# Patient Record
Sex: FEMALE | Race: BLACK OR AFRICAN AMERICAN
[De-identification: names, ages, dates, MRNs, and addresses within clinical notes are randomized per-mention and may not be internally consistent; named-entity substitution may affect disease eponyms.]

---

## 2017-05-08 ENCOUNTER — HOSPITAL ENCOUNTER (INPATIENT)
Dept: HOSPITAL 61 - ER | Age: 67
LOS: 7 days | Discharge: NURSING, REHAB OR LONG-TERM CARE | DRG: 682 | End: 2017-05-15
Attending: FAMILY MEDICINE | Admitting: FAMILY MEDICINE
Payer: MEDICARE

## 2017-05-08 VITALS — SYSTOLIC BLOOD PRESSURE: 191 MMHG | DIASTOLIC BLOOD PRESSURE: 71 MMHG

## 2017-05-08 VITALS — HEIGHT: 68 IN | WEIGHT: 292.19 LBS | BODY MASS INDEX: 44.28 KG/M2

## 2017-05-08 VITALS — SYSTOLIC BLOOD PRESSURE: 183 MMHG | DIASTOLIC BLOOD PRESSURE: 76 MMHG

## 2017-05-08 DIAGNOSIS — Z79.2: ICD-10-CM

## 2017-05-08 DIAGNOSIS — Z79.899: ICD-10-CM

## 2017-05-08 DIAGNOSIS — Z88.2: ICD-10-CM

## 2017-05-08 DIAGNOSIS — Z99.2: ICD-10-CM

## 2017-05-08 DIAGNOSIS — Z88.8: ICD-10-CM

## 2017-05-08 DIAGNOSIS — Z88.0: ICD-10-CM

## 2017-05-08 DIAGNOSIS — E46: ICD-10-CM

## 2017-05-08 DIAGNOSIS — E11.22: ICD-10-CM

## 2017-05-08 DIAGNOSIS — N18.6: ICD-10-CM

## 2017-05-08 DIAGNOSIS — I12.0: Primary | ICD-10-CM

## 2017-05-08 DIAGNOSIS — E21.3: ICD-10-CM

## 2017-05-08 DIAGNOSIS — Z79.82: ICD-10-CM

## 2017-05-08 DIAGNOSIS — Z88.5: ICD-10-CM

## 2017-05-08 DIAGNOSIS — G93.41: ICD-10-CM

## 2017-05-08 DIAGNOSIS — D63.1: ICD-10-CM

## 2017-05-08 LAB
ALBUMIN SERPL-MCNC: 3 G/DL (ref 3.4–5)
ALBUMIN/GLOB SERPL: 0.7 {RATIO} (ref 1–1.7)
ALP SERPL-CCNC: 83 U/L (ref 46–116)
ALT SERPL-CCNC: 22 U/L (ref 14–59)
ANION GAP SERPL CALC-SCNC: 14 MMOL/L (ref 6–14)
AST SERPL-CCNC: 25 U/L (ref 15–37)
BACTERIA #/AREA URNS HPF: 0 /HPF
BASOPHILS # BLD AUTO: 0 X10^3/UL (ref 0–0.2)
BASOPHILS NFR BLD: 1 % (ref 0–3)
BILIRUB SERPL-MCNC: 0.5 MG/DL (ref 0.2–1)
BILIRUB UR QL STRIP: NEGATIVE
BUN SERPL-MCNC: 73 MG/DL (ref 7–20)
BUN/CREAT SERPL: 17 (ref 6–20)
CALCIUM SERPL-MCNC: 10.3 MG/DL (ref 8.5–10.1)
CHLORIDE SERPL-SCNC: 109 MMOL/L (ref 98–107)
CO2 SERPL-SCNC: 21 MMOL/L (ref 21–32)
CREAT SERPL-MCNC: 4.4 MG/DL (ref 0.6–1)
EOSINOPHIL NFR BLD: 2 % (ref 0–3)
ERYTHROCYTE [DISTWIDTH] IN BLOOD BY AUTOMATED COUNT: 18.7 % (ref 11.5–14.5)
GFR SERPLBLD BASED ON 1.73 SQ M-ARVRAT: 12.1 ML/MIN
GLOBULIN SER-MCNC: 4.3 G/DL (ref 2.2–3.8)
GLUCOSE SERPL-MCNC: 124 MG/DL (ref 70–99)
GLUCOSE UR STRIP-MCNC: NEGATIVE MG/DL
HCO3 BLDA-SCNC: 19 MMOL/L (ref 21–28)
HCT VFR BLD CALC: 29.6 % (ref 36–47)
HGB BLD-MCNC: 9.4 G/DL (ref 12–15.5)
INSPIRATION SETTING TIME VENT: 21
LYMPHOCYTES # BLD: 1.2 X10^3/UL (ref 1–4.8)
LYMPHOCYTES NFR BLD AUTO: 17 % (ref 24–48)
MCH RBC QN AUTO: 34 PG (ref 25–35)
MCHC RBC AUTO-ENTMCNC: 32 G/DL (ref 31–37)
MCV RBC AUTO: 107 FL (ref 79–100)
MONOCYTES NFR BLD: 13 % (ref 0–9)
NEUTROPHILS NFR BLD AUTO: 68 % (ref 31–73)
NITRITE UR QL STRIP: NEGATIVE
PCO2 BLDA: 39 MMHG (ref 35–46)
PH ABG: 7.31 (ref 7.35–7.45)
PH UR STRIP: 5.5 [PH]
PLATELET # BLD AUTO: 166 X10^3/UL (ref 140–400)
PO2 BLDA: 68 MMHG (ref 65–108)
POTASSIUM SERPL-SCNC: 5 MMOL/L (ref 3.5–5.1)
PROT SERPL-MCNC: 7.3 G/DL (ref 6.4–8.2)
PROT UR STRIP-MCNC: 30 MG/DL
RBC # BLD AUTO: 2.76 X10^6/UL (ref 3.5–5.4)
RBC #/AREA URNS HPF: (no result) /HPF (ref 0–2)
SAO2 % BLDA: 92 % (ref 92–99)
SODIUM SERPL-SCNC: 144 MMOL/L (ref 136–145)
SP GR UR STRIP: 1.01
SQUAMOUS #/AREA URNS LPF: (no result) /LPF
UROBILINOGEN UR-MCNC: 0.2 MG/DL
WBC # BLD AUTO: 7.4 X10^3/UL (ref 4–11)
WBC #/AREA URNS HPF: (no result) /HPF (ref 0–4)

## 2017-05-08 PROCEDURE — 82140 ASSAY OF AMMONIA: CPT

## 2017-05-08 PROCEDURE — C1892 INTRO/SHEATH,FIXED,PEEL-AWAY: HCPCS

## 2017-05-08 PROCEDURE — 82947 ASSAY GLUCOSE BLOOD QUANT: CPT

## 2017-05-08 PROCEDURE — 76937 US GUIDE VASCULAR ACCESS: CPT

## 2017-05-08 PROCEDURE — 81001 URINALYSIS AUTO W/SCOPE: CPT

## 2017-05-08 PROCEDURE — 36558 INSERT TUNNELED CV CATH: CPT

## 2017-05-08 PROCEDURE — 70450 CT HEAD/BRAIN W/O DYE: CPT

## 2017-05-08 PROCEDURE — 77001 FLUOROGUIDE FOR VEIN DEVICE: CPT

## 2017-05-08 PROCEDURE — 87040 BLOOD CULTURE FOR BACTERIA: CPT

## 2017-05-08 PROCEDURE — 36415 COLL VENOUS BLD VENIPUNCTURE: CPT

## 2017-05-08 PROCEDURE — C1750 CATH, HEMODIALYSIS,LONG-TERM: HCPCS

## 2017-05-08 PROCEDURE — 80053 COMPREHEN METABOLIC PANEL: CPT

## 2017-05-08 PROCEDURE — 96374 THER/PROPH/DIAG INJ IV PUSH: CPT

## 2017-05-08 PROCEDURE — 80076 HEPATIC FUNCTION PANEL: CPT

## 2017-05-08 PROCEDURE — 93005 ELECTROCARDIOGRAM TRACING: CPT

## 2017-05-08 PROCEDURE — 87340 HEPATITIS B SURFACE AG IA: CPT

## 2017-05-08 PROCEDURE — 82805 BLOOD GASES W/O2 SATURATION: CPT

## 2017-05-08 PROCEDURE — 80048 BASIC METABOLIC PNL TOTAL CA: CPT

## 2017-05-08 PROCEDURE — 86704 HEP B CORE ANTIBODY TOTAL: CPT

## 2017-05-08 PROCEDURE — 85610 PROTHROMBIN TIME: CPT

## 2017-05-08 PROCEDURE — A4215 STERILE NEEDLE: HCPCS

## 2017-05-08 PROCEDURE — 86706 HEP B SURFACE ANTIBODY: CPT

## 2017-05-08 PROCEDURE — 87341 HEP B SURFACE AG NEUTRLZJ IA: CPT

## 2017-05-08 PROCEDURE — 71010: CPT

## 2017-05-08 PROCEDURE — 87086 URINE CULTURE/COLONY COUNT: CPT

## 2017-05-08 PROCEDURE — 85027 COMPLETE CBC AUTOMATED: CPT

## 2017-05-08 PROCEDURE — 36600 WITHDRAWAL OF ARTERIAL BLOOD: CPT

## 2017-05-08 RX ADMIN — INSULIN DETEMIR SCH UNITS: 100 INJECTION, SOLUTION SUBCUTANEOUS at 22:30

## 2017-05-08 NOTE — ED.ADGEN
Past Medical History


Past Medical History:  Diabetes-Type II, Renal Disease


Alcohol Use:  None


Drug Use:  None





Adult General


Chief Complaint


Chief Complaint:  ALTERED MENTAL STATUS





HPI


HPI


Patient is a 66  year old -American insulin-dependent diabetes with end-

stage renal disease with recent surgery of left forearm dialysis fistula with 

altered mental status. Patient's family member report patient's been confused 

with somnolence for the past 2 days. Symptoms started vaginally and have been 

progressive. Patient is not currently taking any of her home pain medications. 

She does not have a fever, cough, sore throat. She denies abdominal pain, 

urinary frequency urgency or dysuria. Denies headache, change in vision, 

dizziness or weakness or any strokelike symptom. Blood sugar checked by EMS or 

to arrival noted to be normal.





Review of Systems


Review of Systems


ROS as per HPI.





Current Medications


Current Medications





Current Medications








 Medications


  (Trade)  Dose


 Ordered  Sig/Alisha  Start Time


 Stop Time Status Last Admin


Dose Admin


 


 Vancomycin HCl


 1.25 gm/Sodium


 Chloride  250 ml @ 


 166.667


 mls/hr  1X  ONCE  5/8/17 18:15


 5/8/17 18:26 DC  


 











Allergies


Allergies





Allergies








Coded Allergies Type Severity Reaction Last Updated Verified


 


  Penicillins Allergy Intermediate  10/13/16 Yes


 


  Sulfa (Sulfonamide Antibiotics) Allergy Intermediate  10/13/16 Yes


 


  adhesive tape Allergy Intermediate  5/8/17 Yes


 


  codeine Allergy Intermediate  10/13/16 Yes


 


  doxycycline Allergy Intermediate  10/13/16 Yes











Physical Exam


Physical Exam





Constitutional: Well developed, well nourished, no acute distress, non-toxic 

appearance. 


HENT: Normocephalic, atraumatic, bilateral external ears normal, oropharynx 

moist, no oral exudates, nose normal. 


Eyes: PERRL. 


Neck: Normal range of motion, no tenderness, supple.


Cardiovascular:Heart rate regular rhythm, no murmur. 


Lungs & Thorax:  Bilateral breath sounds clear to auscultation . 


Abdomen: Bowel sounds normal, soft, no tenderness. 


Skin: Warm, dry, appropriate for ethnicity.


Back: No tenderness, no CVA tenderness. 


Extremities: Left forearm dialysis fistula surgical incision with light 

erythema and swelling surrounding surgical wound. Sutures are clean dry and 

intact.


Neurologic: Alert and oriented X 2, normal motor function, normal sensory 

function, no focal deficits noted. 


Psychologic: Affect normal, judgement normal, mood normal.





Current Patient Data


Vital Signs





 Vital Signs








  Date Time  Temp Pulse Resp B/P (MAP) Pulse Ox O2 Delivery O2 Flow Rate FiO2


 


5/8/17 17:44  72 20 184/79 (114) 95 Room Air  


 


5/8/17 15:47 99.0       





 99.0       








Lab Values





 Laboratory Tests








Test


  5/8/17


16:34 5/8/17


16:40 5/8/17


16:51


 


White Blood Count


  7.4 x10^3/uL


(4.0-11.0) 


  


 


 


Red Blood Count


  2.76 x10^6/uL


(3.50-5.40)  L 


  


 


 


Hemoglobin


  9.4 g/dL


(12.0-15.5)  L 


  


 


 


Hematocrit


  29.6 %


(36.0-47.0)  L 


  


 


 


Mean Corpuscular Volume


  107 fL


()  H 


  


 


 


Mean Corpuscular Hemoglobin 34 pg (25-35)    


 


Mean Corpuscular Hemoglobin


Concent 32 g/dL


(31-37) 


  


 


 


Red Cell Distribution Width


  18.7 %


(11.5-14.5)  H 


  


 


 


Platelet Count


  166 x10^3/uL


(140-400) 


  


 


 


Neutrophils (%) (Auto) 68 % (31-73)    


 


Lymphocytes (%) (Auto) 17 % (24-48)  L  


 


Monocytes (%) (Auto) 13 % (0-9)  H  


 


Eosinophils (%) (Auto) 2 % (0-3)    


 


Basophils (%) (Auto) 1 % (0-3)    


 


Neutrophils # (Auto)


  5.0 x10^3uL


(1.8-7.7) 


  


 


 


Lymphocytes # (Auto)


  1.2 x10^3/uL


(1.0-4.8) 


  


 


 


Monocytes # (Auto)


  0.9 x10^3/uL


(0.0-1.1) 


  


 


 


Eosinophils # (Auto)


  0.2 x10^3/uL


(0.0-0.7) 


  


 


 


Basophils # (Auto)


  0.0 x10^3/uL


(0.0-0.2) 


  


 


 


Sodium Level


  144 mmol/L


(136-145) 


  


 


 


Potassium Level


  5.0 mmol/L


(3.5-5.1) 


  


 


 


Chloride Level


  109 mmol/L


()  H 


  


 


 


Carbon Dioxide Level


  21 mmol/L


(21-32) 


  


 


 


Anion Gap 14 (6-14)    


 


Blood Urea Nitrogen


  73 mg/dL


(7-20)  H 


  


 


 


Creatinine


  4.4 mg/dL


(0.6-1.0)  H 


  


 


 


Estimated GFR


(Cockcroft-Gault) 12.1  


  


  


 


 


BUN/Creatinine Ratio 17 (6-20)    


 


Glucose Level


  124 mg/dL


(70-99)  H 


  


 


 


Calcium Level


  10.3 mg/dL


(8.5-10.1)  H 


  


 


 


Total Bilirubin


  0.5 mg/dL


(0.2-1.0) 


  


 


 


Aspartate Amino Transferase


(AST) 25 U/L (15-37)


  


  


 


 


Alanine Aminotransferase (ALT)


  22 U/L (14-59)


  


  


 


 


Alkaline Phosphatase


  83 U/L


() 


  


 


 


Ammonia


  11 mcmol/L


(11-34) 


  


 


 


Total Protein


  7.3 g/dL


(6.4-8.2) 


  


 


 


Albumin


  3.0 g/dL


(3.4-5.0)  L 


  


 


 


Albumin/Globulin Ratio


  0.7 (1.0-1.7)


L 


  


 


 


O2 Saturation  92 % (92-99)   


 


Arterial Blood pH


  


  7.31


(7.35-7.45)  L 


 


 


Arterial Blood pCO2 at


Patient Temp 


  39 mmHg


(35-46) 


 


 


Arterial Blood pO2 at Patient


Temp 


  68 mmHg


() 


 


 


Arterial Blood HCO3


  


  19 mmol/L


(21-28)  L 


 


 


Arterial Blood Base Excess


  


  -7 mmol/L


(-3-3)  L 


 


 


FiO2  21   


 


Urine Collection Type   U cath  


 


Urine Color   Yellow  


 


Urine Clarity   Cloudy  


 


Urine pH   5.5  


 


Urine Specific Gravity   1.010  


 


Urine Protein


  


  


  30 mg/dL


(NEG-TRACE)


 


Urine Glucose (UA)


  


  


  Negative mg/dL


(NEG)


 


Urine Ketones (Stick)


  


  


  Negative mg/dL


(NEG)


 


Urine Blood


  


  


  Negative (NEG)


 


 


Urine Nitrite


  


  


  Negative (NEG)


 


 


Urine Bilirubin


  


  


  Negative (NEG)


 


 


Urine Urobilinogen Dipstick


  


  


  0.2 mg/dL (0.2


mg/dL)


 


Urine Leukocyte Esterase   Trace (NEG)  


 


Urine RBC


  


  


  Occ /HPF (0-2)


 


 


Urine WBC


  


  


  1-4 /HPF (0-4)


 


 


Urine Squamous Epithelial


Cells 


  


  Mod /LPF  


 


 


Urine Amorphous Sediment   Present /HPF  


 


Urine Bacteria


  


  


  0 /HPF (0-FEW)


 


 


Urine Mucus   Slight /LPF  





 Laboratory Tests


5/8/17 16:34








 Laboratory Tests


5/8/17 16:34











EKG


EKG


[EKG: Sinus rhythm]





Radiology/Procedures


Radiology/Procedures


[CT head: No acute disease


X-ray: Cardiomegaly]


Impressions:


Altered mental status etiology unclear. No focal neurologic deficits. Patient 

with low-grade fever and possible early cellulitis of left fistula surgical 

wound. Concern for possible early sepsis.





Course & Med Decision Making


Course & Med Decision Making


Pertinent Labs and Imaging studies reviewed. (See chart for details)





[Case reviewed with Dr. Guillermo Cummings in detail. Recommendations are. 

Antibiotics, admission, vascular surgery and nephrology consult.]





Dragon Disclaimer


Dragon Disclaimer


This electronic medical record was generated, in whole or in part, using a 

voice recognition dictation system.











TAMIKO EDMONDS DO May 8, 2017 16:21

## 2017-05-08 NOTE — RAD
Exam performed: CT scan of the head without contrast. 



Date of Service: 04/08/17. Comparison: None available.



Clinical History: Altered mental status, poor historian.



Technique: Helical acquisitions are obtained from the foramen magnum to the

vertex without intravenous administration of contrast. 



Findings:



The ventricles are midline without evidence of dilatation. Normal gray-white

differentiation is maintained.  There is no extra axial fluid collection,

intraparenchymal hemorrhage or mass lesion.  The visualized portions of the

orbits, paranasal sinuses and the mastoid air cells appear clear.  The

calvarium is intact.  



Impression:

1. No acute intracranial process detected. 





PQRS Compliance Statement:



One or more of the following individualized dose reduction techniques were

utilized for this examination:

1. Automated exposure control

2. Adjustment of the mA and/or kV according to patient size

3. Use of iterative reconstruction technique

## 2017-05-08 NOTE — RAD
Exam performed: One view chest. 



Indication: shortness of air today



Date of Service: 5/8/2017 5:51 PM  Comparison: Two-view chest from 10/29/09



Single AP upright portable view chest findings:



Cardiomediastinal silhouette is mildly enlarged.  Pulmonary vascularity is

unremarkable. No acute infiltrates, effusion or pneumothorax is detected.  The

bony structures are normal. 



Impression:



Mild cardiomegaly, otherwise unremarkable exam

## 2017-05-08 NOTE — [facility]
Admission Forms Criteria


                                           MENTAL STATUS CHANGE





   Clinical Indications for Inpatient Care    


                                                                     


                                                                 (Place 'X' for 

any and all applicable criteria):  





Ongoing inpatient care may be needed for 1 or more of the following(1)(2)(3)(5)(

6): 





[X]I.    Suspected serious etiology (eg, medical disorder, CNS event) of 

altered mental status


[ ]II.   Danger to self or others not manageable at lower level of care


[ ]III.  Grave disability (eg, inability to perform self care necessary at 

lower level of care)


[ ]IV. Agitation or inappropriate behavior interfering with care for primary 

condition (eg, attempting to discontinue


        lines or drains prematurely, unable to cooperate with respiratory care)


[ ]V.  Delirium [A] [D][E] as described by 1 or more of the following(26):


         [ ]a)  Delirium due to alcohol or sedative [F] withdrawal


         [ ]b)  Delirium of uncertain etiology that has not responded to 

appropriate empiric treatment


         [ ]c)  Delirium that prevents performance of a life-sustaining 

function (eg, feeding or hydrating oneself)





[ ]VI.  General contraindications and/or Inappropriate clinical situations for 

Observational Care in patients


          with Mental Status Change, when ANY ONE of the following is required:


          [ ]a)   Prediction of prolongation of LOS based on ANY ONE of the 

following may be considered as 


                    a contraindication for observational care 2, 3, 4, 5, 6, 7, 

8, 9, 10, 11


                    [ ]i)    Age > 65 yrs.


                    [ ]ii)   Patient arriving by ambulance


                    [ ]iii)  Patient with high acuity


                    [ ]iv)  Patient requiring vital sign monitoring


                    [ ]v)   Patient on IV medication


          [ ]b)   Systolic blood pressures  greater than or equal to 180mmHg 3,

12


          [ ]c)   Patient with altered mental status including delirium and 

other alteration of consciousness, (3)


          [ ]d)   Patient whose discharge disposition will be to a skilled 

nursing home or rehabilitation home should 


                   not be managed in Emergency Department Observation Unit. CMS 

rule requires 3 days hospital stay before such placement.3,13


          [ ]e)   Patient with failure to thrive due to broad array of 

etiologies 3,16,17


          [ ]f)   Inability to ambulate 3,14








Extended stay beyond goal length of stay for the primary condition may be 

needed until ALL of the following are present(3)(5):


[ ]a)  Underlying medical etiology of mental status change is absent, or has 

been established and adequately treated


[ ]b)  Danger to self or others is absent or manageable at lower level of care.


[ ]c)  Behavior crisis management, including physical or chemical restraints, 

is not required or available at lower level of car


[ ]d)  Substance or alcohol withdrawal is absent or manageable at lower level 

of care.


[ ]e)  Behavioral symptoms (eg, agitation, somnolence, inappropriate behavior) 

are absent, or are manageable at lower level of care.








The original [facility] content created by [facility] has been revised. 


The portions of the content which have been revised are identified through the 

use of italic text or in bold, and [facility] 


has neither reviewed nor approved the modified material. All other unmodified 

content is copyright  [facility].





Please see references footnoted in the original [facility] edition 

2016


Admission Criteria Met?:  Yes











MARTHA YOUNG May 8, 2017 18:08

## 2017-05-08 NOTE — EKG
[facility]

              [address] [place] [address]

Test Date:    2017               Test Time:    16:33:45

Pat Name:     ARLEEN TRAN             Department:   

Patient ID:   [id number]           Room:          

Gender:       Female                   Technician:   

:          1950               Requested By: TAMIKO EDMONDS

Order Number: [id number]            Reading MD:   John Sims

                                 Measurements

Intervals                              Axis          

Rate:         73                       P:            27

MN:           152                      QRS:          8

QRSD:         84                       T:            28

QT:           374                                    

QTc:          416                                    

                           Interpretive Statements

SINUS RHYTHM



Electronically Signed On 5- 8:59:03 CDT by John Sims

## 2017-05-09 VITALS — SYSTOLIC BLOOD PRESSURE: 130 MMHG | DIASTOLIC BLOOD PRESSURE: 45 MMHG

## 2017-05-09 VITALS — DIASTOLIC BLOOD PRESSURE: 73 MMHG | SYSTOLIC BLOOD PRESSURE: 195 MMHG

## 2017-05-09 VITALS — SYSTOLIC BLOOD PRESSURE: 188 MMHG | DIASTOLIC BLOOD PRESSURE: 67 MMHG

## 2017-05-09 VITALS — DIASTOLIC BLOOD PRESSURE: 59 MMHG | SYSTOLIC BLOOD PRESSURE: 106 MMHG

## 2017-05-09 VITALS — SYSTOLIC BLOOD PRESSURE: 187 MMHG | DIASTOLIC BLOOD PRESSURE: 70 MMHG

## 2017-05-09 VITALS — DIASTOLIC BLOOD PRESSURE: 56 MMHG | SYSTOLIC BLOOD PRESSURE: 119 MMHG

## 2017-05-09 LAB
ANION GAP SERPL CALC-SCNC: 14 MMOL/L (ref 6–14)
BASOPHILS # BLD AUTO: 0 X10^3/UL (ref 0–0.2)
BASOPHILS NFR BLD: 0 % (ref 0–3)
BUN SERPL-MCNC: 69 MG/DL (ref 7–20)
CALCIUM SERPL-MCNC: 9.6 MG/DL (ref 8.5–10.1)
CHLORIDE SERPL-SCNC: 112 MMOL/L (ref 98–107)
CO2 SERPL-SCNC: 20 MMOL/L (ref 21–32)
CREAT SERPL-MCNC: 4.1 MG/DL (ref 0.6–1)
EOSINOPHIL NFR BLD: 2 % (ref 0–3)
ERYTHROCYTE [DISTWIDTH] IN BLOOD BY AUTOMATED COUNT: 18.2 % (ref 11.5–14.5)
GFR SERPLBLD BASED ON 1.73 SQ M-ARVRAT: 13.1 ML/MIN
GLUCOSE SERPL-MCNC: 127 MG/DL (ref 70–99)
HCT VFR BLD CALC: 27.5 % (ref 36–47)
HGB BLD-MCNC: 9.2 G/DL (ref 12–15.5)
INR PPP: 1.3 (ref 0.8–1.1)
LYMPHOCYTES # BLD: 1.3 X10^3/UL (ref 1–4.8)
LYMPHOCYTES NFR BLD AUTO: 17 % (ref 24–48)
MCH RBC QN AUTO: 35 PG (ref 25–35)
MCHC RBC AUTO-ENTMCNC: 34 G/DL (ref 31–37)
MCV RBC AUTO: 106 FL (ref 79–100)
MONOCYTES NFR BLD: 15 % (ref 0–9)
NEUTROPHILS NFR BLD AUTO: 66 % (ref 31–73)
PLATELET # BLD AUTO: 156 X10^3/UL (ref 140–400)
POTASSIUM SERPL-SCNC: 4.7 MMOL/L (ref 3.5–5.1)
PROTHROMBIN TIME: 15 SEC (ref 11.7–14)
RBC # BLD AUTO: 2.6 X10^6/UL (ref 3.5–5.4)
SODIUM SERPL-SCNC: 146 MMOL/L (ref 136–145)
WBC # BLD AUTO: 7.6 X10^3/UL (ref 4–11)

## 2017-05-09 RX ADMIN — ALLOPURINOL SCH MG: 100 TABLET ORAL at 08:46

## 2017-05-09 RX ADMIN — INSULIN ASPART SCH UNITS: 100 INJECTION, SOLUTION INTRAVENOUS; SUBCUTANEOUS at 16:46

## 2017-05-09 RX ADMIN — INSULIN ASPART SCH UNITS: 100 INJECTION, SOLUTION INTRAVENOUS; SUBCUTANEOUS at 12:00

## 2017-05-09 RX ADMIN — MULTIPLE VITAMINS W/ MINERALS TAB SCH TAB: TAB at 09:00

## 2017-05-09 RX ADMIN — PHENYTOIN SODIUM SCH MG: 100 CAPSULE ORAL at 11:30

## 2017-05-09 RX ADMIN — EXTENDED PHENYTOIN SODIUM SCH MG: 30 CAPSULE ORAL at 22:41

## 2017-05-09 RX ADMIN — PANTOPRAZOLE SODIUM SCH MG: 40 TABLET, DELAYED RELEASE ORAL at 08:44

## 2017-05-09 RX ADMIN — PARICALCITOL SCH MCG: 1 CAPSULE, LIQUID FILLED ORAL at 08:44

## 2017-05-09 RX ADMIN — ASPIRIN SCH MG: 81 TABLET, COATED ORAL at 08:44

## 2017-05-09 RX ADMIN — CARVEDILOL SCH MG: 12.5 TABLET, FILM COATED ORAL at 08:00

## 2017-05-09 RX ADMIN — INSULIN DETEMIR SCH UNITS: 100 INJECTION, SOLUTION SUBCUTANEOUS at 21:00

## 2017-05-09 RX ADMIN — METOCLOPRAMIDE HYDROCHLORIDE SCH MG: 5 TABLET ORAL at 22:41

## 2017-05-09 RX ADMIN — PHENYTOIN SODIUM SCH MG: 100 CAPSULE ORAL at 07:13

## 2017-05-09 RX ADMIN — LEVOTHYROXINE SODIUM SCH MCG: 137 TABLET ORAL at 07:13

## 2017-05-09 RX ADMIN — CETIRIZINE HYDROCHLORIDE SCH MG: 10 TABLET, FILM COATED ORAL at 08:44

## 2017-05-09 RX ADMIN — MULTIPLE VITAMINS W/ MINERALS TAB SCH TAB: TAB at 08:44

## 2017-05-09 RX ADMIN — CARVEDILOL SCH MG: 12.5 TABLET, FILM COATED ORAL at 16:52

## 2017-05-09 RX ADMIN — FUROSEMIDE SCH MG: 20 TABLET ORAL at 08:44

## 2017-05-09 NOTE — PDOC2
CONSULT


Date of Consult


Date of Consult


DATE: 5/9/17 


TIME: 11:41





Reason for Consult


Reason for Consult:


RENAL FAILURE





Referring Physician


Referring Physician:


LACEY





Identification/Chief Complaint


Chief Complaint


CONFUSION





Source


Source:  Caregiver, Chart review





History of Present Illness


Reason for Visit:


THIS IS A 66 YR OLD ADMITTED WITH CONFUSION. THERE WAS CONCERNS OF USING SOME 

PERCOCET, CONCERNS OF LEFT ARM CELLULITIS AND CONCERNS OF AN UTI. NOT MUCH 

PERCOCET USE NOTED AND NO EVIDENCE OF ANY INFECTION. SHE IS NOTED TO HAVE AN 

INCREASE IN HER CR TO ABOUT 4.0. THIS IS HIGHER THAN HER BASELINE OF ABOUT 3.3. 

SHE HAS HAD STAGE 4 CKD AND HAS UNDERGONE PLACEMENT OF AND AVF WHICH IS NOT 

MATURE. SHE HAS BEEN SOMNOLENT BUT EASILY AROUSABLE PER FAMILY. LABS ARE C/W 

ESRD





Past Medical History


Cardiovascular:  HTN


Heme/Onc:  Anemia NOS


Renal/:  Chronic renal insuff


Endocrine:  Diabetes, Hyperparathyroidism





Past Surgical History


Past Surgical History


LEFT ARM AVF


Past Surgical History:  Other (left arm fistula)





Social History


No


ALCOHOL:  none


Lives:  Alone


Domestic Violence:  Neg





Current Problem List


Problem List


Problems


Medical Problems:


(1) Altered mental status


Status: Acute  











Current Medications


Current Medications





Current Medications


Vancomycin HCl 1.25 gm/Sodium Chloride 250 ml @  166.667 mls/hr 1X  ONCE IV ;  

Start 5/8/17 at 18:15;  Stop 5/8/17 at 18:26;  Status DC


Vancomycin HCl 2 gm/Sodium Chloride 500 ml @  250 mls/hr 1X  ONCE IV  Last 

administered on 5/8/17at 19:07;  Start 5/8/17 at 19:00;  Stop 5/8/17 at 20:59;  

Status DC


Allopurinol (Zyloprim) 100 mg BID PO ;  Start 5/9/17 at 09:00;  Stop 5/9/17 at 

09:00;  Status DC


Amlodipine Besylate (Norvasc) 10 mg DAILY PO  Last administered on 5/9/17at 08:

46;  Start 5/9/17 at 09:00


Aspirin (Ecotrin) 81 mg DAILY PO  Last administered on 5/9/17at 08:44;  Start 5/ 9/17 at 09:00


Cetirizine HCl (Zyrtec) 10 mg DAILY PO  Last administered on 5/9/17at 08:44;  

Start 5/9/17 at 09:00


Diphenhydramine HCl (Benadryl) 25 mg DAILY PO ;  Start 5/9/17 at 09:00;  Stop 5/ 9/17 at 09:00;  Status DC


Furosemide (Lasix) 20 mg DAILY PO  Last administered on 5/9/17at 08:44;  Start 5 /9/17 at 09:00


Hydralazine HCl (Apresoline) 25 mg BID PO ;  Start 5/9/17 at 09:00


Levothyroxine Sodium (Synthroid) 137 mcg DAILY07 PO  Last administered on 5/9/ 17at 07:13;  Start 5/9/17 at 07:00


Metoclopramide HCl (Reglan) 5 mg HS PO ;  Start 5/9/17 at 21:00


Oxycodone/ Acetaminophen (Percocet 5/325) 1 tab PRN  Q4HRS PO ;  Start 5/8/17 

at 22:15;  Stop 5/8/17 at 22:17;  Status DC


Paricalcitol (Zemplar) 1 mcg DAILY PO  Last administered on 5/9/17at 08:44;  

Start 5/9/17 at 09:00


Phenytoin Sodium (Dilantin) 100 mg BIDACBL PO  Last administered on 5/9/17at 07:

13;  Start 5/9/17 at 07:30


Phenytoin Sodium (Dilantin) 30 mg HS PO ;  Start 5/9/17 at 21:00


Carvedilol (Coreg) 25 mg BIDWMEALS PO ;  Start 5/9/17 at 08:00


Darbepoetin Amrik (Aranesp) 25 mcg WEEKLYHS SQ ;  Start 5/10/17 at 21:00


Insulin Aspart (Novolog) 10 units TIDAC SQ ;  Start 5/9/17 at 07:30;  Stop 5/9/ 17 at 08:44;  Status DC


Insulin Detemir (Levemir) 75 units QHS SQ ;  Start 5/8/17 at 22:30


Non-Formulary Medication 10 unit TIDAC SQ ;  Start 5/9/17 at 07:30;  Status UNV


Multivitamins (Thera M Plus) 1 tab DAILY PO ;  Start 5/9/17 at 09:00


Pantoprazole Sodium (Protonix) 40 mg DAILYAC PO  Last administered on 5/9/17at 

08:44;  Start 5/9/17 at 09:00


Oxycodone/ Acetaminophen (Percocet 5/325) 2 tab PRN Q4HRS  PRN PO PAIN;  Start 5 /8/17 at 22:30


Oxycodone/ Acetaminophen (Percocet 5/325) 1 tab PRN Q4HRS  PRN PO PAIN;  Start 5 /8/17 at 22:30


Allopurinol (Zyloprim) 100 mg DAILY10 PO  Last administered on 5/9/17at 08:46;  

Start 5/9/17 at 10:00


Insulin Aspart (Novolog) 0-7 UNITS TIDWMEALS SQ ;  Start 5/9/17 at 12:00


Dextrose (Dextrose 50%-Water Syringe) 12.5 gm PRN Q15MIN  PRN IV SEE COMMENTS;  

Start 5/9/17 at 08:45





Active Scripts


Active


Reported


Humalog (Insulin Lispro) 100 Unit/1 Ml Insuln.pen 10 Unit SQ TIDAC


Lantus Solostar (Insulin Glargine,Hum.rec.anlog) 100 Unit/1 Ml Insuln.pen 75 

Unit SQ QHS


Dilantin (Phenytoin Sodium Extended) 100 Mg Capsule 30 Mg PO HS


Dilantin (Phenytoin Sodium Extended) 100 Mg Capsule 1 Cap PO BIDACBL


Zemplar (Paricalcitol) 1 Mcg Capsule 1 Mcg PO DAILY


Omeprazole 20 Mg Capsule. 1 Cap PO DAILY


Multi-Day Vitamins (Multivitamin) 1 Each Tablet 1 Tab PO DAILY


Reglan (Metoclopramide Hcl) 10 Mg Tablet 0.5 Tab PO HS


Levothyroxine Sodium 137 Mcg Tablet 1 Tab PO DAILY


Novolog (Insulin Aspart) 100 Unit/1 Ml Cartridge 10 Unit SQ TIDAC


Hydralazine Hcl 25 Mg Tablet 1 Tab PO BID


Percocet 5-325 Mg Tablet (Oxycodone/Acetaminophen) 1 Each Tablet 1-2 Tab PO Q4-

6HRS


Epogen (Epoetin Amrik) 2,000 Unit/1 Ml Vial 2,000 Unit IJ WEEKLY


Synthroid (Levothyroxine Sodium) 137 Mcg Tablet 1 Tab PO DAILY


Zyrtec (Cetirizine Hcl) 10 Mg Tablet 1 Tab PO DAILY


Benadryl (Diphenhydramine Hcl) 25 Mg Capsule 25 Mg PO DAILY


Aspir 81 (Aspirin) 81 Mg Tablet. 81 Mg PO DAILY


Allopurinol 100 Mg Tablet 100 Mg PO DAILY


Carvedilol 25 Mg Tablet 25 Mg PO BIDWMEALS


Omeprazole 20 Mg Tablet.dr 20 Mg PO DAILY


Losartan Potassium 100 Mg Tablet 100 Mg PO DAILY


Amlodipine Besylate 10 Mg Tablet 10 Mg PO DAILY


Furosemide 20 Mg Tablet 20 Mg PO DAILY


Novolog (Insulin Aspart) 100 Unit/1 Ml Vial 12 Unit SQ TIDAC


Lantus (Insulin Glargine,Hum.rec.anlog) 100 Unit/1 Ml Vial 70 Unit SQ HS


Dilantin (Phenytoin Sodium Extended) 100 Mg Capsule 200 Mg PO DAILY


Dilantin (Phenytoin Sodium Extended) 30 Mg Capsule 60 Mg PO DAILY





Allergies


Allergies:  


Coded Allergies:  


     Penicillins (Verified  Allergy, Intermediate, 10/13/16)


     Sulfa (Sulfonamide Antibiotics) (Verified  Allergy, Intermediate, 10/13/16)


     adhesive tape (Verified  Allergy, Intermediate, 5/8/17)


     codeine (Verified  Allergy, Intermediate, 10/13/16)


     doxycycline (Verified  Allergy, Intermediate, 10/13/16)





ROS


Review of System


UNABLE TO OBTAIN PROPERLY. PER FAMILY AS IN HPI





Physical Exam


General:  Alert, Oriented X3, Cooperative, No acute distress


HEENT:  Atraumatic, PERRLA


Lungs:  Clear to auscultation


Heart:  Regular rate, Normal S1


Abdomen:  Normal bowel sounds, Soft, No tenderness


Extremities:  No clubbing


Skin:  No breakdown


Neuro:  Sensation intact


Psych/Mental Status:  Mood NL


MUSCULOSKELETAL:  No deformity





Vitals


VITALS





Vital Signs








  Date Time  Temp Pulse Resp B/P (MAP) Pulse Ox O2 Delivery O2 Flow Rate FiO2


 


5/9/17 11:00 97.7 83 16 187/70 (109) 91 Room Air  





 97.7       


 


5/9/17 00:05       2.0 











Labs


Labs





Laboratory Tests








Test


  5/8/17


16:34 5/8/17


16:40 5/8/17


16:51 5/8/17


22:23


 


White Blood Count


  7.4 x10^3/uL


(4.0-11.0) 


  


  


 


 


Red Blood Count


  2.76 x10^6/uL


(3.50-5.40) 


  


  


 


 


Hemoglobin


  9.4 g/dL


(12.0-15.5) 


  


  


 


 


Hematocrit


  29.6 %


(36.0-47.0) 


  


  


 


 


Mean Corpuscular Volume


  107 fL


() 


  


  


 


 


Mean Corpuscular Hemoglobin 34 pg (25-35)    


 


Mean Corpuscular Hemoglobin


Concent 32 g/dL


(31-37) 


  


  


 


 


Red Cell Distribution Width


  18.7 %


(11.5-14.5) 


  


  


 


 


Platelet Count


  166 x10^3/uL


(140-400) 


  


  


 


 


Neutrophils (%) (Auto) 68 % (31-73)    


 


Lymphocytes (%) (Auto) 17 % (24-48)    


 


Monocytes (%) (Auto) 13 % (0-9)    


 


Eosinophils (%) (Auto) 2 % (0-3)    


 


Basophils (%) (Auto) 1 % (0-3)    


 


Neutrophils # (Auto)


  5.0 x10^3uL


(1.8-7.7) 


  


  


 


 


Lymphocytes # (Auto)


  1.2 x10^3/uL


(1.0-4.8) 


  


  


 


 


Monocytes # (Auto)


  0.9 x10^3/uL


(0.0-1.1) 


  


  


 


 


Eosinophils # (Auto)


  0.2 x10^3/uL


(0.0-0.7) 


  


  


 


 


Basophils # (Auto)


  0.0 x10^3/uL


(0.0-0.2) 


  


  


 


 


Sodium Level


  144 mmol/L


(136-145) 


  


  


 


 


Potassium Level


  5.0 mmol/L


(3.5-5.1) 


  


  


 


 


Chloride Level


  109 mmol/L


() 


  


  


 


 


Carbon Dioxide Level


  21 mmol/L


(21-32) 


  


  


 


 


Anion Gap 14 (6-14)    


 


Blood Urea Nitrogen


  73 mg/dL


(7-20) 


  


  


 


 


Creatinine


  4.4 mg/dL


(0.6-1.0) 


  


  


 


 


Estimated GFR


(Cockcroft-Gault) 12.1 


  


  


  


 


 


BUN/Creatinine Ratio 17 (6-20)    


 


Glucose Level


  124 mg/dL


(70-99) 


  


  


 


 


Calcium Level


  10.3 mg/dL


(8.5-10.1) 


  


  


 


 


Total Bilirubin


  0.5 mg/dL


(0.2-1.0) 


  


  


 


 


Aspartate Amino Transf


(AST/SGOT) 25 U/L (15-37) 


  


  


  


 


 


Alanine Aminotransferase


(ALT/SGPT) 22 U/L (14-59) 


  


  


  


 


 


Alkaline Phosphatase


  83 U/L


() 


  


  


 


 


Ammonia


  11 mcmol/L


(11-34) 


  


  


 


 


Total Protein


  7.3 g/dL


(6.4-8.2) 


  


  


 


 


Albumin


  3.0 g/dL


(3.4-5.0) 


  


  


 


 


Albumin/Globulin Ratio 0.7 (1.0-1.7)    


 


O2 Saturation  92 % (92-99)   


 


Arterial Blood pH


  


  7.31


(7.35-7.45) 


  


 


 


Arterial Blood pCO2 at


Patient Temp 


  39 mmHg


(35-46) 


  


 


 


Arterial Blood pO2 at Patient


Temp 


  68 mmHg


() 


  


 


 


Arterial Blood HCO3


  


  19 mmol/L


(21-28) 


  


 


 


Arterial Blood Base Excess


  


  -7 mmol/L


(-3-3) 


  


 


 


FiO2  21   


 


Urine Collection Type   U cath  


 


Urine Color   Yellow  


 


Urine Clarity   Cloudy  


 


Urine pH   5.5  


 


Urine Specific Gravity   1.010  


 


Urine Protein


  


  


  30 mg/dL


(NEG-TRACE) 


 


 


Urine Glucose (UA)


  


  


  Negative mg/dL


(NEG) 


 


 


Urine Ketones (Stick)


  


  


  Negative mg/dL


(NEG) 


 


 


Urine Blood   Negative (NEG)  


 


Urine Nitrite   Negative (NEG)  


 


Urine Bilirubin   Negative (NEG)  


 


Urine Urobilinogen Dipstick


  


  


  0.2 mg/dL (0.2


mg/dL) 


 


 


Urine Leukocyte Esterase   Trace (NEG)  


 


Urine RBC   Occ /HPF (0-2)  


 


Urine WBC   1-4 /HPF (0-4)  


 


Urine Squamous Epithelial


Cells 


  


  Mod /LPF 


  


 


 


Urine Amorphous Sediment   Present /HPF  


 


Urine Bacteria   0 /HPF (0-FEW)  


 


Urine Mucus   Slight /LPF  


 


Glucose (Fingerstick)


  


  


  


  137 mg/dL


(70-99)


 


Test


  5/9/17


05:50 5/9/17


07:38 5/9/17


11:16 


 


 


White Blood Count


  7.6 x10^3/uL


(4.0-11.0) 


  


  


 


 


Red Blood Count


  2.60 x10^6/uL


(3.50-5.40) 


  


  


 


 


Hemoglobin


  9.2 g/dL


(12.0-15.5) 


  


  


 


 


Hematocrit


  27.5 %


(36.0-47.0) 


  


  


 


 


Mean Corpuscular Volume


  106 fL


() 


  


  


 


 


Mean Corpuscular Hemoglobin 35 pg (25-35)    


 


Mean Corpuscular Hemoglobin


Concent 34 g/dL


(31-37) 


  


  


 


 


Red Cell Distribution Width


  18.2 %


(11.5-14.5) 


  


  


 


 


Platelet Count


  156 x10^3/uL


(140-400) 


  


  


 


 


Neutrophils (%) (Auto) 66 % (31-73)    


 


Lymphocytes (%) (Auto) 17 % (24-48)    


 


Monocytes (%) (Auto) 15 % (0-9)    


 


Eosinophils (%) (Auto) 2 % (0-3)    


 


Basophils (%) (Auto) 0 % (0-3)    


 


Neutrophils # (Auto)


  5.0 x10^3uL


(1.8-7.7) 


  


  


 


 


Lymphocytes # (Auto)


  1.3 x10^3/uL


(1.0-4.8) 


  


  


 


 


Monocytes # (Auto)


  1.1 x10^3/uL


(0.0-1.1) 


  


  


 


 


Eosinophils # (Auto)


  0.2 x10^3/uL


(0.0-0.7) 


  


  


 


 


Basophils # (Auto)


  0.0 x10^3/uL


(0.0-0.2) 


  


  


 


 


Sodium Level


  146 mmol/L


(136-145) 


  


  


 


 


Potassium Level


  4.7 mmol/L


(3.5-5.1) 


  


  


 


 


Chloride Level


  112 mmol/L


() 


  


  


 


 


Carbon Dioxide Level


  20 mmol/L


(21-32) 


  


  


 


 


Anion Gap 14 (6-14)    


 


Blood Urea Nitrogen


  69 mg/dL


(7-20) 


  


  


 


 


Creatinine


  4.1 mg/dL


(0.6-1.0) 


  


  


 


 


Estimated GFR


(Cockcroft-Gault) 13.1 


  


  


  


 


 


Glucose Level


  127 mg/dL


(70-99) 


  


  


 


 


Calcium Level


  9.6 mg/dL


(8.5-10.1) 


  


  


 


 


Glucose (Fingerstick)


  


  118 mg/dL


(70-99) 154 mg/dL


(70-99) 


 








Laboratory Tests








Test


  5/8/17


16:34 5/8/17


16:40 5/8/17


16:51 5/8/17


22:23


 


White Blood Count


  7.4 x10^3/uL


(4.0-11.0) 


  


  


 


 


Red Blood Count


  2.76 x10^6/uL


(3.50-5.40) 


  


  


 


 


Hemoglobin


  9.4 g/dL


(12.0-15.5) 


  


  


 


 


Hematocrit


  29.6 %


(36.0-47.0) 


  


  


 


 


Mean Corpuscular Volume


  107 fL


() 


  


  


 


 


Mean Corpuscular Hemoglobin 34 pg (25-35)    


 


Mean Corpuscular Hemoglobin


Concent 32 g/dL


(31-37) 


  


  


 


 


Red Cell Distribution Width


  18.7 %


(11.5-14.5) 


  


  


 


 


Platelet Count


  166 x10^3/uL


(140-400) 


  


  


 


 


Neutrophils (%) (Auto) 68 % (31-73)    


 


Lymphocytes (%) (Auto) 17 % (24-48)    


 


Monocytes (%) (Auto) 13 % (0-9)    


 


Eosinophils (%) (Auto) 2 % (0-3)    


 


Basophils (%) (Auto) 1 % (0-3)    


 


Neutrophils # (Auto)


  5.0 x10^3uL


(1.8-7.7) 


  


  


 


 


Lymphocytes # (Auto)


  1.2 x10^3/uL


(1.0-4.8) 


  


  


 


 


Monocytes # (Auto)


  0.9 x10^3/uL


(0.0-1.1) 


  


  


 


 


Eosinophils # (Auto)


  0.2 x10^3/uL


(0.0-0.7) 


  


  


 


 


Basophils # (Auto)


  0.0 x10^3/uL


(0.0-0.2) 


  


  


 


 


Sodium Level


  144 mmol/L


(136-145) 


  


  


 


 


Potassium Level


  5.0 mmol/L


(3.5-5.1) 


  


  


 


 


Chloride Level


  109 mmol/L


() 


  


  


 


 


Carbon Dioxide Level


  21 mmol/L


(21-32) 


  


  


 


 


Anion Gap 14 (6-14)    


 


Blood Urea Nitrogen


  73 mg/dL


(7-20) 


  


  


 


 


Creatinine


  4.4 mg/dL


(0.6-1.0) 


  


  


 


 


Estimated GFR


(Cockcroft-Gault) 12.1 


  


  


  


 


 


BUN/Creatinine Ratio 17 (6-20)    


 


Glucose Level


  124 mg/dL


(70-99) 


  


  


 


 


Calcium Level


  10.3 mg/dL


(8.5-10.1) 


  


  


 


 


Total Bilirubin


  0.5 mg/dL


(0.2-1.0) 


  


  


 


 


Aspartate Amino Transf


(AST/SGOT) 25 U/L (15-37) 


  


  


  


 


 


Alanine Aminotransferase


(ALT/SGPT) 22 U/L (14-59) 


  


  


  


 


 


Alkaline Phosphatase


  83 U/L


() 


  


  


 


 


Ammonia


  11 mcmol/L


(11-34) 


  


  


 


 


Total Protein


  7.3 g/dL


(6.4-8.2) 


  


  


 


 


Albumin


  3.0 g/dL


(3.4-5.0) 


  


  


 


 


Albumin/Globulin Ratio 0.7 (1.0-1.7)    


 


O2 Saturation  92 % (92-99)   


 


Arterial Blood pH


  


  7.31


(7.35-7.45) 


  


 


 


Arterial Blood pCO2 at


Patient Temp 


  39 mmHg


(35-46) 


  


 


 


Arterial Blood pO2 at Patient


Temp 


  68 mmHg


() 


  


 


 


Arterial Blood HCO3


  


  19 mmol/L


(21-28) 


  


 


 


Arterial Blood Base Excess


  


  -7 mmol/L


(-3-3) 


  


 


 


FiO2  21   


 


Urine Collection Type   U cath  


 


Urine Color   Yellow  


 


Urine Clarity   Cloudy  


 


Urine pH   5.5  


 


Urine Specific Gravity   1.010  


 


Urine Protein


  


  


  30 mg/dL


(NEG-TRACE) 


 


 


Urine Glucose (UA)


  


  


  Negative mg/dL


(NEG) 


 


 


Urine Ketones (Stick)


  


  


  Negative mg/dL


(NEG) 


 


 


Urine Blood   Negative (NEG)  


 


Urine Nitrite   Negative (NEG)  


 


Urine Bilirubin   Negative (NEG)  


 


Urine Urobilinogen Dipstick


  


  


  0.2 mg/dL (0.2


mg/dL) 


 


 


Urine Leukocyte Esterase   Trace (NEG)  


 


Urine RBC   Occ /HPF (0-2)  


 


Urine WBC   1-4 /HPF (0-4)  


 


Urine Squamous Epithelial


Cells 


  


  Mod /LPF 


  


 


 


Urine Amorphous Sediment   Present /HPF  


 


Urine Bacteria   0 /HPF (0-FEW)  


 


Urine Mucus   Slight /LPF  


 


Glucose (Fingerstick)


  


  


  


  137 mg/dL


(70-99)


 


Test


  5/9/17


05:50 5/9/17


07:38 5/9/17


11:16 


 


 


White Blood Count


  7.6 x10^3/uL


(4.0-11.0) 


  


  


 


 


Red Blood Count


  2.60 x10^6/uL


(3.50-5.40) 


  


  


 


 


Hemoglobin


  9.2 g/dL


(12.0-15.5) 


  


  


 


 


Hematocrit


  27.5 %


(36.0-47.0) 


  


  


 


 


Mean Corpuscular Volume


  106 fL


() 


  


  


 


 


Mean Corpuscular Hemoglobin 35 pg (25-35)    


 


Mean Corpuscular Hemoglobin


Concent 34 g/dL


(31-37) 


  


  


 


 


Red Cell Distribution Width


  18.2 %


(11.5-14.5) 


  


  


 


 


Platelet Count


  156 x10^3/uL


(140-400) 


  


  


 


 


Neutrophils (%) (Auto) 66 % (31-73)    


 


Lymphocytes (%) (Auto) 17 % (24-48)    


 


Monocytes (%) (Auto) 15 % (0-9)    


 


Eosinophils (%) (Auto) 2 % (0-3)    


 


Basophils (%) (Auto) 0 % (0-3)    


 


Neutrophils # (Auto)


  5.0 x10^3uL


(1.8-7.7) 


  


  


 


 


Lymphocytes # (Auto)


  1.3 x10^3/uL


(1.0-4.8) 


  


  


 


 


Monocytes # (Auto)


  1.1 x10^3/uL


(0.0-1.1) 


  


  


 


 


Eosinophils # (Auto)


  0.2 x10^3/uL


(0.0-0.7) 


  


  


 


 


Basophils # (Auto)


  0.0 x10^3/uL


(0.0-0.2) 


  


  


 


 


Sodium Level


  146 mmol/L


(136-145) 


  


  


 


 


Potassium Level


  4.7 mmol/L


(3.5-5.1) 


  


  


 


 


Chloride Level


  112 mmol/L


() 


  


  


 


 


Carbon Dioxide Level


  20 mmol/L


(21-32) 


  


  


 


 


Anion Gap 14 (6-14)    


 


Blood Urea Nitrogen


  69 mg/dL


(7-20) 


  


  


 


 


Creatinine


  4.1 mg/dL


(0.6-1.0) 


  


  


 


 


Estimated GFR


(Cockcroft-Gault) 13.1 


  


  


  


 


 


Glucose Level


  127 mg/dL


(70-99) 


  


  


 


 


Calcium Level


  9.6 mg/dL


(8.5-10.1) 


  


  


 


 


Glucose (Fingerstick)


  


  118 mg/dL


(70-99) 154 mg/dL


(70-99) 


 











Assessment/Plan


Assessment/Plan


IMP





NEW ESRD


ANEMIA


UREMIA


HTN 


DM II


S/P TRANSPOSITION OF LEFT ARM AVF


MALNUTRITION





PLAN





ARANESP


INITIATE HD


WILL HAVE IR PLACE TUNNELED HD CATHETER


WILL HAVE CM SET UP OP HD 


UPDATED FAMILY











PHIL MARCUS MD May 9, 2017 11:46

## 2017-05-09 NOTE — PDOC2
CONSULT


Date of Consult


Date of Consult


DATE: 5/9/17 


TIME: 09:29





Reason for Consult


Reason for Consult:


Cellulitis left arm arm





Referring Physician


Referring Physician:


Dr. Cummings





Identification/Chief Complaint


Chief Complaint


mental status changes





Source


Source:  Caregiver, Patient





History of Present Illness


Reason for Visit:


Ms. Patiño is a 67 y/o female with HTN, ESRD that was admitted for mental 

status changes.  She recently had a left upper arm basilic vein transposition 

by Dr. Sharma last week.  The family states she had pain after the surgery, and 

has been taking percocet that has increased her confusion.  She has episodes of 

confusion and not responding appropriately.  She denies any focal areas of 

weakness, numbness, visual changes, or slurred speech.  She does complain of 

weakness to the right hand, but her sensation is intact and denies any 

numbness.  She had a CT head in the ED that was negative for any acute 

intracranial process, a chest x-ray that was negative for pneumonia, and a UA 

that was negative.  After the surgery there initially was some bruising, and 

now mild residual redness.  No opening in the wound or drainage.  She denies 

any fevers.





Past Medical History


Cardiovascular:  HTN


Renal/:  Chronic renal insuff





Past Surgical History


Past Surgical History:  Other (left arm fistula)





Current Problem List


Problem List


Problems


Medical Problems:


(1) Altered mental status


Status: Acute  











Current Medications


Current Medications





Current Medications


Vancomycin HCl 1.25 gm/Sodium Chloride 250 ml @  166.667 mls/hr 1X  ONCE IV ;  

Start 5/8/17 at 18:15;  Stop 5/8/17 at 18:26;  Status DC


Vancomycin HCl 2 gm/Sodium Chloride 500 ml @  250 mls/hr 1X  ONCE IV  Last 

administered on 5/8/17at 19:07;  Start 5/8/17 at 19:00;  Stop 5/8/17 at 20:59;  

Status DC


Allopurinol (Zyloprim) 100 mg BID PO ;  Start 5/9/17 at 09:00;  Stop 5/9/17 at 

09:00;  Status DC


Amlodipine Besylate (Norvasc) 10 mg DAILY PO  Last administered on 5/9/17at 08:

46;  Start 5/9/17 at 09:00


Aspirin (Ecotrin) 81 mg DAILY PO  Last administered on 5/9/17at 08:44;  Start 5/ 9/17 at 09:00


Cetirizine HCl (Zyrtec) 10 mg DAILY PO  Last administered on 5/9/17at 08:44;  

Start 5/9/17 at 09:00


Diphenhydramine HCl (Benadryl) 25 mg DAILY PO ;  Start 5/9/17 at 09:00;  Stop 5/ 9/17 at 09:00;  Status DC


Furosemide (Lasix) 20 mg DAILY PO  Last administered on 5/9/17at 08:44;  Start 5 /9/17 at 09:00


Hydralazine HCl (Apresoline) 25 mg BID PO ;  Start 5/9/17 at 09:00


Levothyroxine Sodium (Synthroid) 137 mcg DAILY07 PO  Last administered on 5/9/ 17at 07:13;  Start 5/9/17 at 07:00


Metoclopramide HCl (Reglan) 5 mg HS PO ;  Start 5/9/17 at 21:00


Oxycodone/ Acetaminophen (Percocet 5/325) 1 tab PRN  Q4HRS PO ;  Start 5/8/17 

at 22:15;  Stop 5/8/17 at 22:17;  Status DC


Paricalcitol (Zemplar) 1 mcg DAILY PO  Last administered on 5/9/17at 08:44;  

Start 5/9/17 at 09:00


Phenytoin Sodium (Dilantin) 100 mg BIDACBL PO  Last administered on 5/9/17at 07:

13;  Start 5/9/17 at 07:30


Phenytoin Sodium (Dilantin) 30 mg HS PO ;  Start 5/9/17 at 21:00


Carvedilol (Coreg) 25 mg BIDWMEALS PO ;  Start 5/9/17 at 08:00


Darbepoetin Amrik (Aranesp) 25 mcg WEEKLYHS SQ ;  Start 5/10/17 at 21:00


Insulin Aspart (Novolog) 10 units TIDAC SQ ;  Start 5/9/17 at 07:30;  Stop 5/9/ 17 at 08:44;  Status DC


Insulin Detemir (Levemir) 75 units QHS SQ ;  Start 5/8/17 at 22:30


Non-Formulary Medication 10 unit TIDAC SQ ;  Start 5/9/17 at 07:30;  Status UNV


Multivitamins (Thera M Plus) 1 tab DAILY PO  Last administered on 5/9/17at 08:44

;  Start 5/9/17 at 09:00


Pantoprazole Sodium (Protonix) 40 mg DAILYAC PO  Last administered on 5/9/17at 

08:44;  Start 5/9/17 at 09:00


Oxycodone/ Acetaminophen (Percocet 5/325) 2 tab PRN Q4HRS  PRN PO PAIN;  Start 5 /8/17 at 22:30


Oxycodone/ Acetaminophen (Percocet 5/325) 1 tab PRN Q4HRS  PRN PO PAIN;  Start 5 /8/17 at 22:30


Allopurinol (Zyloprim) 100 mg DAILY10 PO  Last administered on 5/9/17at 08:46;  

Start 5/9/17 at 10:00


Insulin Aspart (Novolog) 0-7 UNITS TIDWMEALS SQ ;  Start 5/9/17 at 12:00


Dextrose (Dextrose 50%-Water Syringe) 12.5 gm PRN Q15MIN  PRN IV SEE COMMENTS;  

Start 5/9/17 at 08:45





Active Scripts


Active


Reported


Humalog (Insulin Lispro) 100 Unit/1 Ml Insuln.pen 10 Unit SQ TIDAC


Lantus Solostar (Insulin Glargine,Hum.rec.anlog) 100 Unit/1 Ml Insuln.pen 75 

Unit SQ QHS


Dilantin (Phenytoin Sodium Extended) 100 Mg Capsule 30 Mg PO HS


Dilantin (Phenytoin Sodium Extended) 100 Mg Capsule 1 Cap PO BIDACBL


Zemplar (Paricalcitol) 1 Mcg Capsule 1 Mcg PO DAILY


Omeprazole 20 Mg Capsule. 1 Cap PO DAILY


Multi-Day Vitamins (Multivitamin) 1 Each Tablet 1 Tab PO DAILY


Reglan (Metoclopramide Hcl) 10 Mg Tablet 0.5 Tab PO HS


Levothyroxine Sodium 137 Mcg Tablet 1 Tab PO DAILY


Novolog (Insulin Aspart) 100 Unit/1 Ml Cartridge 10 Unit SQ TIDAC


Hydralazine Hcl 25 Mg Tablet 1 Tab PO BID


Percocet 5-325 Mg Tablet (Oxycodone/Acetaminophen) 1 Each Tablet 1-2 Tab PO Q4-

6HRS


Epogen (Epoetin Amrik) 2,000 Unit/1 Ml Vial 2,000 Unit IJ WEEKLY


Synthroid (Levothyroxine Sodium) 137 Mcg Tablet 1 Tab PO DAILY


Zyrtec (Cetirizine Hcl) 10 Mg Tablet 1 Tab PO DAILY


Benadryl (Diphenhydramine Hcl) 25 Mg Capsule 25 Mg PO DAILY


Aspir 81 (Aspirin) 81 Mg Tablet.dr 81 Mg PO DAILY


Allopurinol 100 Mg Tablet 100 Mg PO DAILY


Carvedilol 25 Mg Tablet 25 Mg PO BIDWMEALS


Omeprazole 20 Mg Tablet.dr 20 Mg PO DAILY


Losartan Potassium 100 Mg Tablet 100 Mg PO DAILY


Amlodipine Besylate 10 Mg Tablet 10 Mg PO DAILY


Furosemide 20 Mg Tablet 20 Mg PO DAILY


Novolog (Insulin Aspart) 100 Unit/1 Ml Vial 12 Unit SQ TIDAC


Lantus (Insulin Glargine,Hum.rec.anlog) 100 Unit/1 Ml Vial 70 Unit SQ HS


Dilantin (Phenytoin Sodium Extended) 100 Mg Capsule 200 Mg PO DAILY


Dilantin (Phenytoin Sodium Extended) 30 Mg Capsule 60 Mg PO DAILY





Allergies


Allergies:  


Coded Allergies:  


     Penicillins (Verified  Allergy, Intermediate, 10/13/16)


     Sulfa (Sulfonamide Antibiotics) (Verified  Allergy, Intermediate, 10/13/16)


     adhesive tape (Verified  Allergy, Intermediate, 5/8/17)


     codeine (Verified  Allergy, Intermediate, 10/13/16)


     doxycycline (Verified  Allergy, Intermediate, 10/13/16)





Physical Exam


General:  Alert, Oriented X3


HEENT:  Atraumatic, EOMI


Lungs:  Clear to auscultation, Normal air movement


Heart:  Regular rate, Normal S1, Normal S2


Abdomen:  Normal bowel sounds, Soft, No tenderness


Skin:  Other (Left upper arm: incision intact, no drainage, mild iman-

incisional erythema.  Papable left radial pulse.  Palpable left thrill over the 

basilic vein. )


Neuro:  Normal speech, Other (Left hand with weak  and weak arm flexion / 

extension.  RUE and b/l LE normal strength)





Vitals


VITALS





Vital Signs








  Date Time  Temp Pulse Resp B/P (MAP) Pulse Ox O2 Delivery O2 Flow Rate FiO2


 


5/9/17 08:46  78  119/56    


 


5/9/17 07:00 98.1  19  98 Room Air  





 98.1       


 


5/9/17 00:05       2.0 











Labs


Labs





Laboratory Tests








Test


  5/8/17


16:34 5/8/17


16:40 5/8/17


16:51 5/8/17


22:23


 


White Blood Count


  7.4 x10^3/uL


(4.0-11.0) 


  


  


 


 


Red Blood Count


  2.76 x10^6/uL


(3.50-5.40) 


  


  


 


 


Hemoglobin


  9.4 g/dL


(12.0-15.5) 


  


  


 


 


Hematocrit


  29.6 %


(36.0-47.0) 


  


  


 


 


Mean Corpuscular Volume


  107 fL


() 


  


  


 


 


Mean Corpuscular Hemoglobin 34 pg (25-35)    


 


Mean Corpuscular Hemoglobin


Concent 32 g/dL


(31-37) 


  


  


 


 


Red Cell Distribution Width


  18.7 %


(11.5-14.5) 


  


  


 


 


Platelet Count


  166 x10^3/uL


(140-400) 


  


  


 


 


Neutrophils (%) (Auto) 68 % (31-73)    


 


Lymphocytes (%) (Auto) 17 % (24-48)    


 


Monocytes (%) (Auto) 13 % (0-9)    


 


Eosinophils (%) (Auto) 2 % (0-3)    


 


Basophils (%) (Auto) 1 % (0-3)    


 


Neutrophils # (Auto)


  5.0 x10^3uL


(1.8-7.7) 


  


  


 


 


Lymphocytes # (Auto)


  1.2 x10^3/uL


(1.0-4.8) 


  


  


 


 


Monocytes # (Auto)


  0.9 x10^3/uL


(0.0-1.1) 


  


  


 


 


Eosinophils # (Auto)


  0.2 x10^3/uL


(0.0-0.7) 


  


  


 


 


Basophils # (Auto)


  0.0 x10^3/uL


(0.0-0.2) 


  


  


 


 


Sodium Level


  144 mmol/L


(136-145) 


  


  


 


 


Potassium Level


  5.0 mmol/L


(3.5-5.1) 


  


  


 


 


Chloride Level


  109 mmol/L


() 


  


  


 


 


Carbon Dioxide Level


  21 mmol/L


(21-32) 


  


  


 


 


Anion Gap 14 (6-14)    


 


Blood Urea Nitrogen


  73 mg/dL


(7-20) 


  


  


 


 


Creatinine


  4.4 mg/dL


(0.6-1.0) 


  


  


 


 


Estimated GFR


(Cockcroft-Gault) 12.1 


  


  


  


 


 


BUN/Creatinine Ratio 17 (6-20)    


 


Glucose Level


  124 mg/dL


(70-99) 


  


  


 


 


Calcium Level


  10.3 mg/dL


(8.5-10.1) 


  


  


 


 


Total Bilirubin


  0.5 mg/dL


(0.2-1.0) 


  


  


 


 


Aspartate Amino Transf


(AST/SGOT) 25 U/L (15-37) 


  


  


  


 


 


Alanine Aminotransferase


(ALT/SGPT) 22 U/L (14-59) 


  


  


  


 


 


Alkaline Phosphatase


  83 U/L


() 


  


  


 


 


Ammonia


  11 mcmol/L


(11-34) 


  


  


 


 


Total Protein


  7.3 g/dL


(6.4-8.2) 


  


  


 


 


Albumin


  3.0 g/dL


(3.4-5.0) 


  


  


 


 


Albumin/Globulin Ratio 0.7 (1.0-1.7)    


 


O2 Saturation  92 % (92-99)   


 


Arterial Blood pH


  


  7.31


(7.35-7.45) 


  


 


 


Arterial Blood pCO2 at


Patient Temp 


  39 mmHg


(35-46) 


  


 


 


Arterial Blood pO2 at Patient


Temp 


  68 mmHg


() 


  


 


 


Arterial Blood HCO3


  


  19 mmol/L


(21-28) 


  


 


 


Arterial Blood Base Excess


  


  -7 mmol/L


(-3-3) 


  


 


 


FiO2  21   


 


Urine Collection Type   U cath  


 


Urine Color   Yellow  


 


Urine Clarity   Cloudy  


 


Urine pH   5.5  


 


Urine Specific Gravity   1.010  


 


Urine Protein


  


  


  30 mg/dL


(NEG-TRACE) 


 


 


Urine Glucose (UA)


  


  


  Negative mg/dL


(NEG) 


 


 


Urine Ketones (Stick)


  


  


  Negative mg/dL


(NEG) 


 


 


Urine Blood   Negative (NEG)  


 


Urine Nitrite   Negative (NEG)  


 


Urine Bilirubin   Negative (NEG)  


 


Urine Urobilinogen Dipstick


  


  


  0.2 mg/dL (0.2


mg/dL) 


 


 


Urine Leukocyte Esterase   Trace (NEG)  


 


Urine RBC   Occ /HPF (0-2)  


 


Urine WBC   1-4 /HPF (0-4)  


 


Urine Squamous Epithelial


Cells 


  


  Mod /LPF 


  


 


 


Urine Amorphous Sediment   Present /HPF  


 


Urine Bacteria   0 /HPF (0-FEW)  


 


Urine Mucus   Slight /LPF  


 


Glucose (Fingerstick)


  


  


  


  137 mg/dL


(70-99)


 


Test


  5/9/17


05:50 5/9/17


07:38 


  


 


 


White Blood Count


  7.6 x10^3/uL


(4.0-11.0) 


  


  


 


 


Red Blood Count


  2.60 x10^6/uL


(3.50-5.40) 


  


  


 


 


Hemoglobin


  9.2 g/dL


(12.0-15.5) 


  


  


 


 


Hematocrit


  27.5 %


(36.0-47.0) 


  


  


 


 


Mean Corpuscular Volume


  106 fL


() 


  


  


 


 


Mean Corpuscular Hemoglobin 35 pg (25-35)    


 


Mean Corpuscular Hemoglobin


Concent 34 g/dL


(31-37) 


  


  


 


 


Red Cell Distribution Width


  18.2 %


(11.5-14.5) 


  


  


 


 


Platelet Count


  156 x10^3/uL


(140-400) 


  


  


 


 


Neutrophils (%) (Auto) 66 % (31-73)    


 


Lymphocytes (%) (Auto) 17 % (24-48)    


 


Monocytes (%) (Auto) 15 % (0-9)    


 


Eosinophils (%) (Auto) 2 % (0-3)    


 


Basophils (%) (Auto) 0 % (0-3)    


 


Neutrophils # (Auto)


  5.0 x10^3uL


(1.8-7.7) 


  


  


 


 


Lymphocytes # (Auto)


  1.3 x10^3/uL


(1.0-4.8) 


  


  


 


 


Monocytes # (Auto)


  1.1 x10^3/uL


(0.0-1.1) 


  


  


 


 


Eosinophils # (Auto)


  0.2 x10^3/uL


(0.0-0.7) 


  


  


 


 


Basophils # (Auto)


  0.0 x10^3/uL


(0.0-0.2) 


  


  


 


 


Sodium Level


  146 mmol/L


(136-145) 


  


  


 


 


Potassium Level


  4.7 mmol/L


(3.5-5.1) 


  


  


 


 


Chloride Level


  112 mmol/L


() 


  


  


 


 


Carbon Dioxide Level


  20 mmol/L


(21-32) 


  


  


 


 


Anion Gap 14 (6-14)    


 


Blood Urea Nitrogen


  69 mg/dL


(7-20) 


  


  


 


 


Creatinine


  4.1 mg/dL


(0.6-1.0) 


  


  


 


 


Estimated GFR


(Cockcroft-Gault) 13.1 


  


  


  


 


 


Glucose Level


  127 mg/dL


(70-99) 


  


  


 


 


Calcium Level


  9.6 mg/dL


(8.5-10.1) 


  


  


 


 


Glucose (Fingerstick)


  


  118 mg/dL


(70-99) 


  


 








Laboratory Tests








Test


  5/8/17


16:34 5/8/17


16:40 5/8/17


16:51 5/8/17


22:23


 


White Blood Count


  7.4 x10^3/uL


(4.0-11.0) 


  


  


 


 


Red Blood Count


  2.76 x10^6/uL


(3.50-5.40) 


  


  


 


 


Hemoglobin


  9.4 g/dL


(12.0-15.5) 


  


  


 


 


Hematocrit


  29.6 %


(36.0-47.0) 


  


  


 


 


Mean Corpuscular Volume


  107 fL


() 


  


  


 


 


Mean Corpuscular Hemoglobin 34 pg (25-35)    


 


Mean Corpuscular Hemoglobin


Concent 32 g/dL


(31-37) 


  


  


 


 


Red Cell Distribution Width


  18.7 %


(11.5-14.5) 


  


  


 


 


Platelet Count


  166 x10^3/uL


(140-400) 


  


  


 


 


Neutrophils (%) (Auto) 68 % (31-73)    


 


Lymphocytes (%) (Auto) 17 % (24-48)    


 


Monocytes (%) (Auto) 13 % (0-9)    


 


Eosinophils (%) (Auto) 2 % (0-3)    


 


Basophils (%) (Auto) 1 % (0-3)    


 


Neutrophils # (Auto)


  5.0 x10^3uL


(1.8-7.7) 


  


  


 


 


Lymphocytes # (Auto)


  1.2 x10^3/uL


(1.0-4.8) 


  


  


 


 


Monocytes # (Auto)


  0.9 x10^3/uL


(0.0-1.1) 


  


  


 


 


Eosinophils # (Auto)


  0.2 x10^3/uL


(0.0-0.7) 


  


  


 


 


Basophils # (Auto)


  0.0 x10^3/uL


(0.0-0.2) 


  


  


 


 


Sodium Level


  144 mmol/L


(136-145) 


  


  


 


 


Potassium Level


  5.0 mmol/L


(3.5-5.1) 


  


  


 


 


Chloride Level


  109 mmol/L


() 


  


  


 


 


Carbon Dioxide Level


  21 mmol/L


(21-32) 


  


  


 


 


Anion Gap 14 (6-14)    


 


Blood Urea Nitrogen


  73 mg/dL


(7-20) 


  


  


 


 


Creatinine


  4.4 mg/dL


(0.6-1.0) 


  


  


 


 


Estimated GFR


(Cockcroft-Gault) 12.1 


  


  


  


 


 


BUN/Creatinine Ratio 17 (6-20)    


 


Glucose Level


  124 mg/dL


(70-99) 


  


  


 


 


Calcium Level


  10.3 mg/dL


(8.5-10.1) 


  


  


 


 


Total Bilirubin


  0.5 mg/dL


(0.2-1.0) 


  


  


 


 


Aspartate Amino Transf


(AST/SGOT) 25 U/L (15-37) 


  


  


  


 


 


Alanine Aminotransferase


(ALT/SGPT) 22 U/L (14-59) 


  


  


  


 


 


Alkaline Phosphatase


  83 U/L


() 


  


  


 


 


Ammonia


  11 mcmol/L


(11-34) 


  


  


 


 


Total Protein


  7.3 g/dL


(6.4-8.2) 


  


  


 


 


Albumin


  3.0 g/dL


(3.4-5.0) 


  


  


 


 


Albumin/Globulin Ratio 0.7 (1.0-1.7)    


 


O2 Saturation  92 % (92-99)   


 


Arterial Blood pH


  


  7.31


(7.35-7.45) 


  


 


 


Arterial Blood pCO2 at


Patient Temp 


  39 mmHg


(35-46) 


  


 


 


Arterial Blood pO2 at Patient


Temp 


  68 mmHg


() 


  


 


 


Arterial Blood HCO3


  


  19 mmol/L


(21-28) 


  


 


 


Arterial Blood Base Excess


  


  -7 mmol/L


(-3-3) 


  


 


 


FiO2  21   


 


Urine Collection Type   U cath  


 


Urine Color   Yellow  


 


Urine Clarity   Cloudy  


 


Urine pH   5.5  


 


Urine Specific Gravity   1.010  


 


Urine Protein


  


  


  30 mg/dL


(NEG-TRACE) 


 


 


Urine Glucose (UA)


  


  


  Negative mg/dL


(NEG) 


 


 


Urine Ketones (Stick)


  


  


  Negative mg/dL


(NEG) 


 


 


Urine Blood   Negative (NEG)  


 


Urine Nitrite   Negative (NEG)  


 


Urine Bilirubin   Negative (NEG)  


 


Urine Urobilinogen Dipstick


  


  


  0.2 mg/dL (0.2


mg/dL) 


 


 


Urine Leukocyte Esterase   Trace (NEG)  


 


Urine RBC   Occ /HPF (0-2)  


 


Urine WBC   1-4 /HPF (0-4)  


 


Urine Squamous Epithelial


Cells 


  


  Mod /LPF 


  


 


 


Urine Amorphous Sediment   Present /HPF  


 


Urine Bacteria   0 /HPF (0-FEW)  


 


Urine Mucus   Slight /LPF  


 


Glucose (Fingerstick)


  


  


  


  137 mg/dL


(70-99)


 


Test


  5/9/17


05:50 5/9/17


07:38 


  


 


 


White Blood Count


  7.6 x10^3/uL


(4.0-11.0) 


  


  


 


 


Red Blood Count


  2.60 x10^6/uL


(3.50-5.40) 


  


  


 


 


Hemoglobin


  9.2 g/dL


(12.0-15.5) 


  


  


 


 


Hematocrit


  27.5 %


(36.0-47.0) 


  


  


 


 


Mean Corpuscular Volume


  106 fL


() 


  


  


 


 


Mean Corpuscular Hemoglobin 35 pg (25-35)    


 


Mean Corpuscular Hemoglobin


Concent 34 g/dL


(31-37) 


  


  


 


 


Red Cell Distribution Width


  18.2 %


(11.5-14.5) 


  


  


 


 


Platelet Count


  156 x10^3/uL


(140-400) 


  


  


 


 


Neutrophils (%) (Auto) 66 % (31-73)    


 


Lymphocytes (%) (Auto) 17 % (24-48)    


 


Monocytes (%) (Auto) 15 % (0-9)    


 


Eosinophils (%) (Auto) 2 % (0-3)    


 


Basophils (%) (Auto) 0 % (0-3)    


 


Neutrophils # (Auto)


  5.0 x10^3uL


(1.8-7.7) 


  


  


 


 


Lymphocytes # (Auto)


  1.3 x10^3/uL


(1.0-4.8) 


  


  


 


 


Monocytes # (Auto)


  1.1 x10^3/uL


(0.0-1.1) 


  


  


 


 


Eosinophils # (Auto)


  0.2 x10^3/uL


(0.0-0.7) 


  


  


 


 


Basophils # (Auto)


  0.0 x10^3/uL


(0.0-0.2) 


  


  


 


 


Sodium Level


  146 mmol/L


(136-145) 


  


  


 


 


Potassium Level


  4.7 mmol/L


(3.5-5.1) 


  


  


 


 


Chloride Level


  112 mmol/L


() 


  


  


 


 


Carbon Dioxide Level


  20 mmol/L


(21-32) 


  


  


 


 


Anion Gap 14 (6-14)    


 


Blood Urea Nitrogen


  69 mg/dL


(7-20) 


  


  


 


 


Creatinine


  4.1 mg/dL


(0.6-1.0) 


  


  


 


 


Estimated GFR


(Cockcroft-Gault) 13.1 


  


  


  


 


 


Glucose Level


  127 mg/dL


(70-99) 


  


  


 


 


Calcium Level


  9.6 mg/dL


(8.5-10.1) 


  


  


 


 


Glucose (Fingerstick)


  


  118 mg/dL


(70-99) 


  


 











Images


Images


CT head 5/8: no acute intracranial process


Chest X-Ray: mild cardiomegaly





Assessment/Plan


Assessment/Plan


67 y/o w/ chronic renal insufficiency s/p L basilic vein transposition (last 

week) with mental status changes





The labs and images were reviewed: and she does not have leukocytosis, UA 

negative, CXR negative, and the CT Head was negative for acute intracranial 

process.  The left arm has some mild erythema, but the wound is intact and 

without any drainage.  Per the family, there was some bruising around the 

incisional site that has resolved.  She received a dose of Vancomycin, and 

blood cultures are pending.  She may have cellulitis of the left upper arm, and 

would recommend to continue oral keflex for cellulitis.  She does not have a 

prosthetic graft.  Her left hand has some weakness, but this has not been 

getting progressively worse after the surgery.  She has a palpable left radial 

pulse.  Will plan to continue to follow the patient, and she will follow-up as 

an outpatient with Dr. Sharma.











LAUREN GANDHI MD May 9, 2017 09:43

## 2017-05-09 NOTE — HP
ADMIT DATE:  05/08/2017



CHIEF COMPLAINT:  Confusion.



HISTORY OF PRESENT ILLNESS:  This is a 66-year-old black female who had a large

left upper arm AV shunt procedure done about 5 days ago at [facility] by

Dr. Perla Sharma.  In the last day or so she has been noted to be more confused at

home without any focal specific signs or symptoms.  ER evaluation was

unremarkable.  CT scan of the head as well as lab work was normal and she was

given a single dose of vancomycin as ____ possible relationship to wound

infection.  She has no complaints at this time.



PAST MEDICAL HISTORY:

MEDICATIONS:  All meds noted per the chart.



ALLERGIES:  SULFA AND PENICILLINS.



She is preparing for dialysis under the care of Dr. Wharton.  She is an

insulin-dependent diabetic, well controlled, last A1c was about December and to

my knowledge was around 7.2.



SOCIAL HISTORY:  Nonsmoker.  , lives with family.



FAMILY HISTORY:  Unremarkable.



REVIEW OF SYSTEMS:  Unremarkable.



OBJECTIVE:

ENT:  All within normal limits.

NECK:  No nodes, masses, or bruits.

LUNGS:  Clear, no tachypnea.

CARDIOVASCULAR:  Regular rate.  No irregular beat or murmur.

EXTREMITIES:  She has a clean-looking large left upper extremity incision. 

There is some tenderness medially with no overt drainage or redness.  Lower

extremities normal, pedal pulses diminished.

NEUROLOGIC:  She is sluggish and responsive.  She knows my name, moves all

extremities well, not able to respond to the question of date or time, but knows

she is at [facility].  Gait was not tested.  No tremors are noted.

GENITOURINARY AND RECTAL:  Deferred.



ASSESSMENT:  Confusional state about 5 days after a vascular surgery for shunt

procedure.  My main concern would be sepsis after the wound as there is no other

obvious source of infection or vascular signs.



PLAN:  Await blood cultures and appropriate consultants and further symptoms as

the dose of vancomycin will cover her for about the next 48 hours pending

cultures.

 



______________________________

GODFREY RAHMAN MD



DR:  [email or web address]/becky  JOB#:  [id number] / [id number]

DD:  05/09/2017 08:47  DT:  05/09/2017 09:11

## 2017-05-10 VITALS — SYSTOLIC BLOOD PRESSURE: 185 MMHG | DIASTOLIC BLOOD PRESSURE: 60 MMHG

## 2017-05-10 VITALS — DIASTOLIC BLOOD PRESSURE: 93 MMHG | SYSTOLIC BLOOD PRESSURE: 208 MMHG

## 2017-05-10 VITALS — SYSTOLIC BLOOD PRESSURE: 195 MMHG | DIASTOLIC BLOOD PRESSURE: 71 MMHG

## 2017-05-10 VITALS — SYSTOLIC BLOOD PRESSURE: 184 MMHG | DIASTOLIC BLOOD PRESSURE: 74 MMHG

## 2017-05-10 LAB
ALBUMIN SERPL-MCNC: 2.7 G/DL (ref 3.4–5)
ALP SERPL-CCNC: 82 U/L (ref 46–116)
ALT SERPL-CCNC: 24 U/L (ref 14–59)
ANION GAP SERPL CALC-SCNC: 14 MMOL/L (ref 6–14)
AST SERPL-CCNC: 25 U/L (ref 15–37)
BILIRUB DIRECT SERPL-MCNC: 0.2 MG/DL (ref 0–0.2)
BILIRUB SERPL-MCNC: 0.4 MG/DL (ref 0.2–1)
BUN SERPL-MCNC: 70 MG/DL (ref 7–20)
CALCIUM SERPL-MCNC: 10.1 MG/DL (ref 8.5–10.1)
CHLORIDE SERPL-SCNC: 111 MMOL/L (ref 98–107)
CO2 SERPL-SCNC: 20 MMOL/L (ref 21–32)
CREAT SERPL-MCNC: 3.8 MG/DL (ref 0.6–1)
ERYTHROCYTE [DISTWIDTH] IN BLOOD BY AUTOMATED COUNT: 18 % (ref 11.5–14.5)
GFR SERPLBLD BASED ON 1.73 SQ M-ARVRAT: 14.3 ML/MIN
GLUCOSE SERPL-MCNC: 123 MG/DL (ref 70–99)
HCT VFR BLD CALC: 28.5 % (ref 36–47)
HGB BLD-MCNC: 9.4 G/DL (ref 12–15.5)
MCH RBC QN AUTO: 35 PG (ref 25–35)
MCHC RBC AUTO-ENTMCNC: 33 G/DL (ref 31–37)
MCV RBC AUTO: 105 FL (ref 79–100)
PLATELET # BLD AUTO: 161 X10^3/UL (ref 140–400)
POTASSIUM SERPL-SCNC: 4.7 MMOL/L (ref 3.5–5.1)
PROT SERPL-MCNC: 7 G/DL (ref 6.4–8.2)
RBC # BLD AUTO: 2.71 X10^6/UL (ref 3.5–5.4)
SODIUM SERPL-SCNC: 145 MMOL/L (ref 136–145)
WBC # BLD AUTO: 7.3 X10^3/UL (ref 4–11)

## 2017-05-10 PROCEDURE — 5A1D60Z: ICD-10-PCS

## 2017-05-10 RX ADMIN — INSULIN ASPART SCH UNITS: 100 INJECTION, SOLUTION INTRAVENOUS; SUBCUTANEOUS at 17:00

## 2017-05-10 RX ADMIN — INSULIN DETEMIR SCH UNITS: 100 INJECTION, SOLUTION SUBCUTANEOUS at 21:00

## 2017-05-10 RX ADMIN — PHENYTOIN SODIUM SCH MG: 100 CAPSULE ORAL at 11:30

## 2017-05-10 RX ADMIN — CARVEDILOL SCH MG: 12.5 TABLET, FILM COATED ORAL at 08:29

## 2017-05-10 RX ADMIN — EXTENDED PHENYTOIN SODIUM SCH MG: 30 CAPSULE ORAL at 20:50

## 2017-05-10 RX ADMIN — INSULIN ASPART SCH UNITS: 100 INJECTION, SOLUTION INTRAVENOUS; SUBCUTANEOUS at 12:00

## 2017-05-10 RX ADMIN — PARICALCITOL SCH MCG: 1 CAPSULE, LIQUID FILLED ORAL at 09:00

## 2017-05-10 RX ADMIN — PANTOPRAZOLE SODIUM SCH MG: 40 TABLET, DELAYED RELEASE ORAL at 06:28

## 2017-05-10 RX ADMIN — MULTIPLE VITAMINS W/ MINERALS TAB SCH TAB: TAB at 09:00

## 2017-05-10 RX ADMIN — PHENYTOIN SODIUM SCH MG: 100 CAPSULE ORAL at 06:29

## 2017-05-10 RX ADMIN — OXYCODONE HYDROCHLORIDE AND ACETAMINOPHEN PRN TAB: 5; 325 TABLET ORAL at 16:11

## 2017-05-10 RX ADMIN — CETIRIZINE HYDROCHLORIDE SCH MG: 10 TABLET, FILM COATED ORAL at 09:00

## 2017-05-10 RX ADMIN — ALLOPURINOL SCH MG: 100 TABLET ORAL at 10:00

## 2017-05-10 RX ADMIN — METOCLOPRAMIDE HYDROCHLORIDE SCH MG: 5 TABLET ORAL at 20:50

## 2017-05-10 RX ADMIN — FUROSEMIDE SCH MG: 20 TABLET ORAL at 09:00

## 2017-05-10 RX ADMIN — INSULIN ASPART SCH UNITS: 100 INJECTION, SOLUTION INTRAVENOUS; SUBCUTANEOUS at 08:00

## 2017-05-10 RX ADMIN — LEVOTHYROXINE SODIUM SCH MCG: 137 TABLET ORAL at 06:28

## 2017-05-10 RX ADMIN — CARVEDILOL SCH MG: 12.5 TABLET, FILM COATED ORAL at 17:00

## 2017-05-10 RX ADMIN — ASPIRIN SCH MG: 81 TABLET, COATED ORAL at 09:00

## 2017-05-10 NOTE — PDOC
Provider Note


Provider Note


vss, no temp- still sleepy but oriented- cults neg- d/w family, no need for 

antibiotics at present, ms should clear w/ dialysis











GODFREY RAHMAN MD May 10, 2017 08:19

## 2017-05-10 NOTE — PDOC
MODERATE SEDATION ASSESSMENT


RISKS/ALTERNATIVES


Risks/Alternatives


Risks and alternatives of this type of sedation and procedure discussed with:


RISK/ALTERNATIVES:  Sig. Other (Discussed with patient's sons.)





H & P ON CHART


H & P


H & P on chart and reviewed for co-morbid conditions and appropriate labs.


H&P ON CHART:  Yes





PREGNANCY STATUS


PREG STATUS ASSESSED:  N/A





MEDS/ALLERGIES REVIEWED


Meds/Allergies Reviewed


Medications and Allergies including time and route of recently administered 

narcotics and sedatives.


MEDS/ALLERGIES REVIEWED:  Yes





ASA RATING


ASA RATING:  III





AIRWAY ASSESSMENT


Airway Assessment


Airway patency, oral function limitations, presence  of caps, crowns, dentures, 

partials, and ability to extend neck assessed.


AIRWAY ASSESSMENT:  Yes





MALLAMPATI SCORE


MALLAMPATI SCORE:  III





PRE-SEDATION ASSESSMENT


PRE-SEDATION ASSESSMENT:  Yes











KIKE ASHER MD May 10, 2017 14:33

## 2017-05-10 NOTE — PDOC
Exam








Napoleon





Assistant


Assistant


B Cates





Pre-Procedure Diagnosis


Pre-Procedure Diagnosis


67 YO female with ESRD and with recently created, immature AVF.  Needs HD.  

Tunneled HDC insertion requested by Renal.





Post-Procedure Diagnosis


Post-Procedure Diagnosis


Same





Procedure Performed


Procedure Performed


Sono/fluoro guided tunneled HDC insertion.





Type of Anesthesia


Type of Anesthesia


Local + Mod sedation.





Estimated Blood Loss


EBL:


Minimal





Drain/Tubes


Drains/Tubes


Right IJ 15.5F 28cm DuraMax tunneled HDC.





Condition of Patient


Condition of Patient


No change.  No apparent complication.





Disposition


Disposition


From IR return to [facility] for recovery.  OK to use tunneled HDC now.  F/u with 

Renal.  Full report to follow.











KIKE ASHER MD May 10, 2017 14:38

## 2017-05-10 NOTE — PDOC
Renal-Progress Notes


Subjective Notes


Notes


EASILY AROUSABLE BUT SOMNOLENT





History of Present Illness


Hx of present illness


STABLE





Vitals


Vitals





Vital Signs








  Date Time  Temp Pulse Resp B/P (MAP) Pulse Ox O2 Delivery O2 Flow Rate FiO2


 


5/10/17 08:30  80  195/71    


 


5/10/17 08:00      Room Air 2.0 


 


5/10/17 07:35 97.7  18  93   





 97.7       








Weight


Weight [ ]





I.O.


Intake and Output











Intake and Output 


 


 5/10/17





 06:59


 


Intake Total 810 ml


 


Output Total 225 ml


 


Balance 585 ml


 


 


 


Intake Oral 810 ml


 


Output Urine Total 225 ml


 


# Voids 4











Labs


Labs





Laboratory Tests








Test


  5/9/17


12:30 5/9/17


16:41 5/9/17


20:59 5/10/17


06:20


 


Prothrombin Time


  15.0 SEC


(11.7-14.0) 


  


  


 


 


Prothromb Time International


Ratio 1.3 (0.8-1.1) 


  


  


  


 


 


Hepatitis B Surface Antigen


  Negative


(Negative) 


  


  


 


 


Hepatitis B Surface Antibody Reactive (.)    


 


Glucose (Fingerstick)


  


  137 mg/dL


(70-99) 134 mg/dL


(70-99) 


 


 


White Blood Count


  


  


  


  7.3 x10^3/uL


(4.0-11.0)


 


Red Blood Count


  


  


  


  2.71 x10^6/uL


(3.50-5.40)


 


Hemoglobin


  


  


  


  9.4 g/dL


(12.0-15.5)


 


Hematocrit


  


  


  


  28.5 %


(36.0-47.0)


 


Mean Corpuscular Volume


  


  


  


  105 fL


()


 


Mean Corpuscular Hemoglobin    35 pg (25-35) 


 


Mean Corpuscular Hemoglobin


Concent 


  


  


  33 g/dL


(31-37)


 


Red Cell Distribution Width


  


  


  


  18.0 %


(11.5-14.5)


 


Platelet Count


  


  


  


  161 x10^3/uL


(140-400)


 


Sodium Level


  


  


  


  145 mmol/L


(136-145)


 


Potassium Level


  


  


  


  4.7 mmol/L


(3.5-5.1)


 


Chloride Level


  


  


  


  111 mmol/L


()


 


Carbon Dioxide Level


  


  


  


  20 mmol/L


(21-32)


 


Anion Gap    14 (6-14) 


 


Blood Urea Nitrogen


  


  


  


  70 mg/dL


(7-20)


 


Creatinine


  


  


  


  3.8 mg/dL


(0.6-1.0)


 


Estimated GFR


(Cockcroft-Gault) 


  


  


  14.3 


 


 


Glucose Level


  


  


  


  123 mg/dL


(70-99)


 


Calcium Level


  


  


  


  10.1 mg/dL


(8.5-10.1)


 


Total Bilirubin


  


  


  


  0.4 mg/dL


(0.2-1.0)


 


Direct Bilirubin


  


  


  


  0.2 mg/dL


(0.0-0.2)


 


Aspartate Amino Transf


(AST/SGOT) 


  


  


  25 U/L (15-37) 


 


 


Alanine Aminotransferase


(ALT/SGPT) 


  


  


  24 U/L (14-59) 


 


 


Alkaline Phosphatase


  


  


  


  82 U/L


()


 


Total Protein


  


  


  


  7.0 g/dL


(6.4-8.2)


 


Albumin


  


  


  


  2.7 g/dL


(3.4-5.0)


 


Test


  5/10/17


07:36 


  


  


 


 


Glucose (Fingerstick)


  124 mg/dL


(70-99) 


  


  


 











Micro


Micro





Microbiology


5/8/17 Blood Culture - Preliminary, Resulted


         NO GROWTH AFTER 1 DAY


5/8/17 Urine Culture - Preliminary, Resulted


         


5/8/17 Urine Culture Result 1 ([facility]) - Preliminary, Resulted





Review of Systems


Constitutional:  yes: weakness


Ears/Nose/Throat:  Yes: no symptom reported


Eyes:  Yes: no symptom reported


Pulmonary:  Yes no symptom reported


Cardiovascular:  Yes no symptom reported


Gastrointestional:  Yes: no symptom reported


Psychiatric/Neurological:  Yes: confusion, weakness


Endocrine:  Yes: no symptom reported





Physical Exam


General Appearance:  no apparent distress


Skin:  warm


Respiratory:  bilateral CTA


Heart:  S1S2


Abdomen:  soft, bowel sounds present


Genitourinary:  bladder flat


Extremities:  pulses present


Neurology:  alert, oriented





Assessment


Assessment


IMP





UREMIA


ANEMIA


ESRD


PROB UREMIC RELATED ENCEPHALOPATHY


HTN





PLAN





PLAN WAS FOR TUNNELED HD CATHETER TODAY


PT HAS REFUSED IN IR LAB THIS AM


SHE MAY WANT TO GO HOME


EXPLAINED TO FAMILY THAT SOMNOLENCE IS MOST LIKELY DUE TO UREMIA SINCE THERE 

ARE NO OTHER EXPLANATIONS


FAMILY ATTEMPTING TO CONVINCE PT











PHIL MARCUS MD May 10, 2017 11:34

## 2017-05-11 VITALS — DIASTOLIC BLOOD PRESSURE: 61 MMHG | SYSTOLIC BLOOD PRESSURE: 113 MMHG

## 2017-05-11 VITALS — SYSTOLIC BLOOD PRESSURE: 155 MMHG | DIASTOLIC BLOOD PRESSURE: 86 MMHG

## 2017-05-11 VITALS — SYSTOLIC BLOOD PRESSURE: 125 MMHG | DIASTOLIC BLOOD PRESSURE: 52 MMHG

## 2017-05-11 VITALS — DIASTOLIC BLOOD PRESSURE: 71 MMHG | SYSTOLIC BLOOD PRESSURE: 179 MMHG

## 2017-05-11 VITALS — SYSTOLIC BLOOD PRESSURE: 123 MMHG | DIASTOLIC BLOOD PRESSURE: 64 MMHG

## 2017-05-11 VITALS — SYSTOLIC BLOOD PRESSURE: 138 MMHG | DIASTOLIC BLOOD PRESSURE: 95 MMHG

## 2017-05-11 LAB
ANION GAP SERPL CALC-SCNC: 9 MMOL/L (ref 6–14)
BUN SERPL-MCNC: 43 MG/DL (ref 7–20)
CALCIUM SERPL-MCNC: 9.7 MG/DL (ref 8.5–10.1)
CHLORIDE SERPL-SCNC: 104 MMOL/L (ref 98–107)
CO2 SERPL-SCNC: 28 MMOL/L (ref 21–32)
CREAT SERPL-MCNC: 2.9 MG/DL (ref 0.6–1)
ERYTHROCYTE [DISTWIDTH] IN BLOOD BY AUTOMATED COUNT: 17.5 % (ref 11.5–14.5)
GFR SERPLBLD BASED ON 1.73 SQ M-ARVRAT: 19.5 ML/MIN
GLUCOSE SERPL-MCNC: 150 MG/DL (ref 70–99)
HCT VFR BLD CALC: 29.9 % (ref 36–47)
HGB BLD-MCNC: 9.8 G/DL (ref 12–15.5)
MCH RBC QN AUTO: 34 PG (ref 25–35)
MCHC RBC AUTO-ENTMCNC: 33 G/DL (ref 31–37)
MCV RBC AUTO: 104 FL (ref 79–100)
PLATELET # BLD AUTO: 173 X10^3/UL (ref 140–400)
POTASSIUM SERPL-SCNC: 4 MMOL/L (ref 3.5–5.1)
RBC # BLD AUTO: 2.87 X10^6/UL (ref 3.5–5.4)
SODIUM SERPL-SCNC: 141 MMOL/L (ref 136–145)
WBC # BLD AUTO: 9.2 X10^3/UL (ref 4–11)

## 2017-05-11 PROCEDURE — B2141ZZ FLUOROSCOPY OF RIGHT HEART USING LOW OSMOLAR CONTRAST: ICD-10-PCS | Performed by: RADIOLOGY

## 2017-05-11 PROCEDURE — 02H633Z INSERTION OF INFUSION DEVICE INTO RIGHT ATRIUM, PERCUTANEOUS APPROACH: ICD-10-PCS | Performed by: RADIOLOGY

## 2017-05-11 PROCEDURE — B244ZZZ ULTRASONOGRAPHY OF RIGHT HEART: ICD-10-PCS | Performed by: RADIOLOGY

## 2017-05-11 RX ADMIN — ALLOPURINOL SCH MG: 100 TABLET ORAL at 11:59

## 2017-05-11 RX ADMIN — LISINOPRIL SCH MG: 20 TABLET ORAL at 13:25

## 2017-05-11 RX ADMIN — FUROSEMIDE SCH MG: 20 TABLET ORAL at 11:56

## 2017-05-11 RX ADMIN — ASPIRIN SCH MG: 81 TABLET, COATED ORAL at 11:56

## 2017-05-11 RX ADMIN — MULTIPLE VITAMINS W/ MINERALS TAB SCH TAB: TAB at 11:57

## 2017-05-11 RX ADMIN — CARVEDILOL SCH MG: 12.5 TABLET, FILM COATED ORAL at 11:51

## 2017-05-11 RX ADMIN — PARICALCITOL SCH MCG: 1 CAPSULE, LIQUID FILLED ORAL at 17:11

## 2017-05-11 RX ADMIN — INSULIN ASPART SCH UNITS: 100 INJECTION, SOLUTION INTRAVENOUS; SUBCUTANEOUS at 17:16

## 2017-05-11 RX ADMIN — LEVOTHYROXINE SODIUM SCH MCG: 137 TABLET ORAL at 06:26

## 2017-05-11 RX ADMIN — ACETAMINOPHEN PRN MG: 325 TABLET, FILM COATED ORAL at 21:36

## 2017-05-11 RX ADMIN — ALLOPURINOL SCH MG: 100 TABLET ORAL at 17:11

## 2017-05-11 RX ADMIN — METOCLOPRAMIDE HYDROCHLORIDE SCH MG: 5 TABLET ORAL at 21:36

## 2017-05-11 RX ADMIN — PHENYTOIN SODIUM SCH MG: 100 CAPSULE ORAL at 06:27

## 2017-05-11 RX ADMIN — PARICALCITOL SCH MCG: 1 CAPSULE, LIQUID FILLED ORAL at 11:58

## 2017-05-11 RX ADMIN — INSULIN ASPART SCH UNITS: 100 INJECTION, SOLUTION INTRAVENOUS; SUBCUTANEOUS at 12:00

## 2017-05-11 RX ADMIN — OXYCODONE HYDROCHLORIDE AND ACETAMINOPHEN PRN TAB: 5; 325 TABLET ORAL at 10:10

## 2017-05-11 RX ADMIN — EXTENDED PHENYTOIN SODIUM SCH MG: 30 CAPSULE ORAL at 21:36

## 2017-05-11 RX ADMIN — CETIRIZINE HYDROCHLORIDE SCH MG: 10 TABLET, FILM COATED ORAL at 11:58

## 2017-05-11 RX ADMIN — INSULIN ASPART SCH UNITS: 100 INJECTION, SOLUTION INTRAVENOUS; SUBCUTANEOUS at 08:00

## 2017-05-11 RX ADMIN — CARVEDILOL SCH MG: 12.5 TABLET, FILM COATED ORAL at 17:12

## 2017-05-11 RX ADMIN — PHENYTOIN SODIUM SCH MG: 100 CAPSULE ORAL at 13:24

## 2017-05-11 RX ADMIN — PANTOPRAZOLE SODIUM SCH MG: 40 TABLET, DELAYED RELEASE ORAL at 06:27

## 2017-05-11 NOTE — RAD
Ultrasound and fluoro guided placement of right IJ tunneled hemodialysis

catheter



Indication: 66-year-old female with end-stage renal disease, and with recently

placed, immature left upper arm AV fistula. Tunneled dialysis catheter

insertion has been requested by renal.



Fluoro time: 1.2 minutes



Kerma-Area Product:  6 Gycm2



Moderate sedation: 21 minutes moderate sedation was provided utilizing a total

of 2 mg Versed and 50 mcg fentanyl, IV. The patient was appropriately

monitored by a qualified independent observer throughout the time of moderate

sedation.



Antibiotic: A single dose of Clindamycin was administered within 1 hour of the

procedure start time.  Cephalosporin was withheld due to allergy.



Sterility: All elements of maximal sterile barrier technique, including the

use of a cap, mask, sterile gown, sterile gloves, large sterile sheet,

appropriate hand hygiene, and 2% chlorhexidine for cutaneous antisepsis (or

acceptable alternative antiseptic per current guidelines) were utilized.



Consent: The procedure was explained in its entirety to the patient and/or the

patient's designated representative by a member of the treatment team.  This

included a discussion of risks and benefits and commonly accepted alternatives

to the procedure, as well as expected consequences of no treatment at all.

Discussion of risks included, but was not limited to, those that are most

frequent (bleeding, infection, and catheter malfunction) and those that are

rare, but possibly severe or life-threatening, as well as the possibility of

unforeseen complications. 





Procedure: Informed consent was obtained from the patient's son.  She was

placed supine on the angiography table.  Preliminary ultrasound examination of

right neck revealed wide patency of right internal jugular vein, which was

documented with a hard copy ultrasound image.  Right neck and upper chest were

then prepped and draped in the usual sterile fashion, utilizing all elements

of maximal sterile barrier technique, as described above.  Moderate sedation

was provided with IV Versed and Fentanyl. 600 mg clindamycin was given IV,

prophylactically.  Using aseptic technique and local anesthesia, a small skin

incision was made lateral to right internal jugular vein, just above clavicle.

 Using aseptic technique, local anesthesia, and direct ultrasound guidance, a

micropuncture needle was successfully introduced into right internal jugular

vein. The micropuncture needle was then exchanged over a microguidewire for a

micropuncture sheath, through which an Amplatz wire was advanced into IVC,

under fluoroscopic control.  A second small skin incision was then made along

upper anterior aspect of right chest.  A subcutaneous tunnel was then

fashioned between the right chest and supraclavicular incisions.  A 15.5 F 28

cm Dura Max dialysis catheter was pulled through the subcutaneous tunnel from

inferior to superior, utilizing the tunneling device provided.  The right IJ

venostomy tract was then sequentially dilated and the 15.5 Congolese dialysis

catheter was easily advanced centrally through a 16 Congolese peel-away sheath,

and was positioned with its tip at the level of upper right atrium utilizing

fluoroscopic guidance.  This catheter was demonstrated to flush and aspirate

normally, was packed, and was secured at the right chest exit site utilizing

2-0 Prolene and sterile dressing.  The small supraclavicular incision was

closed with 4-0 Vicryl, Steri-Strips, and sterile dressing.  Patient tolerated

the procedure well without apparent complication.  Satisfactory position of

the dialysis catheter was confirmed with a single fluoroscopic spot image. 



Impression: Successful, uneventful ultrasound and fluoro guided placement of

right IJ 15.5 F 28 cm Dura Max tunneled hemodialysis catheter, as described.

## 2017-05-11 NOTE — PDOC
Provider Note


Provider Note


Vascular


S: Patient seen on dialysis, "sleepy"


O: VSS, temp .3 last pm


    left arm with moderate swelling, good thrill in fistula, incision intact, 

no erythema noted


    PC right chest functioning well


A/P: ESRD on dialysis


    Continue dialysis via PC


    f/u with Dr. Sharma as scheduled for post op fistula placement











DELICIA SILVA May 11, 2017 08:52

## 2017-05-11 NOTE — PDOC
Provider Note


Provider Note


seen in dialysis, less sleepy, oriented- low grade temp noted- labs ok, cults 

neg- will follow but better ms seems to be related to dialysis











GODFREY RAHMAN MD May 11, 2017 08:53

## 2017-05-11 NOTE — PDOC
Renal-Progress Notes


Subjective Notes


Notes


LESS CONFUSED





History of Present Illness


Hx of present illness


STABLE





Vitals


Vitals





Vital Signs








  Date Time  Temp Pulse Resp B/P (MAP) Pulse Ox O2 Delivery O2 Flow Rate FiO2


 


5/11/17 10:10   16  97 Room Air  


 


5/11/17 07:00 97.7 61  155/86 (109)    





 97.7       


 


5/10/17 14:30       2.0 








Weight


Weight [ ]





I.O.


Intake and Output











Intake and Output 


 


 5/11/17





 07:00


 


Intake Total 0 ml


 


Output Total 525 ml


 


Balance -525 ml


 


 


 


Intake Oral 0 ml


 


Output Urine Total 525 ml


 


# Voids 3











Labs


Labs





Laboratory Tests








Test


  5/10/17


20:47 5/11/17


07:30


 


Glucose (Fingerstick)


  124 mg/dL


(70-99) 


 


 


White Blood Count


  


  9.2 x10^3/uL


(4.0-11.0)


 


Red Blood Count


  


  2.87 x10^6/uL


(3.50-5.40)


 


Hemoglobin


  


  9.8 g/dL


(12.0-15.5)


 


Hematocrit


  


  29.9 %


(36.0-47.0)


 


Mean Corpuscular Volume


  


  104 fL


()


 


Mean Corpuscular Hemoglobin  34 pg (25-35) 


 


Mean Corpuscular Hemoglobin


Concent 


  33 g/dL


(31-37)


 


Red Cell Distribution Width


  


  17.5 %


(11.5-14.5)


 


Platelet Count


  


  173 x10^3/uL


(140-400)


 


Sodium Level


  


  141 mmol/L


(136-145)


 


Potassium Level


  


  4.0 mmol/L


(3.5-5.1)


 


Chloride Level


  


  104 mmol/L


()


 


Carbon Dioxide Level


  


  28 mmol/L


(21-32)


 


Anion Gap  9 (6-14) 


 


Blood Urea Nitrogen


  


  43 mg/dL


(7-20)


 


Creatinine


  


  2.9 mg/dL


(0.6-1.0)


 


Estimated GFR


(Cockcroft-Gault) 


  19.5 


 


 


Glucose Level


  


  150 mg/dL


(70-99)


 


Calcium Level


  


  9.7 mg/dL


(8.5-10.1)











Micro


Micro





Microbiology


5/8/17 Blood Culture - Preliminary, Resulted


         NO GROWTH AFTER 2 DAYS


5/8/17 Urine Culture - Final, Complete


         


5/8/17 Urine Culture Result 1 ([facility]) - Final, Complete





Review of Systems


Constitutional:  yes: weakness


Ears/Nose/Throat:  Yes: no symptom reported


Eyes:  Yes: no symptom reported


Pulmonary:  Yes no symptom reported


Cardiovascular:  Yes no symptom reported


Gastrointestional:  Yes: no symptom reported


Psychiatric/Neurological:  Yes: confusion, weakness


Endocrine:  Yes: no symptom reported





Physical Exam


General Appearance:  no apparent distress


Skin:  warm


Respiratory:  bilateral CTA


Heart:  S1S2


Abdomen:  soft, bowel sounds present


Genitourinary:  bladder flat


Extremities:  pulses present


Neurology:  alert, oriented





Assessment


Assessment


IMP





UREMIA


ANEMIA


ESRD-AGREED TO HD YESTERDAY


PROB UREMIC RELATED ENCEPHALOPATHY


HTN





PLAN





2ND HD TODAY


MIN UF AGAIN


INCREASE QT TO 3


HD AGAIN IN AM


CM SETTING UP OP HD


UPDATED SON











PHIL MARCUS MD May 11, 2017 10:34

## 2017-05-12 VITALS — SYSTOLIC BLOOD PRESSURE: 119 MMHG | DIASTOLIC BLOOD PRESSURE: 58 MMHG

## 2017-05-12 VITALS — DIASTOLIC BLOOD PRESSURE: 47 MMHG | SYSTOLIC BLOOD PRESSURE: 142 MMHG

## 2017-05-12 VITALS — DIASTOLIC BLOOD PRESSURE: 52 MMHG | SYSTOLIC BLOOD PRESSURE: 115 MMHG

## 2017-05-12 VITALS — DIASTOLIC BLOOD PRESSURE: 51 MMHG | SYSTOLIC BLOOD PRESSURE: 141 MMHG

## 2017-05-12 VITALS — SYSTOLIC BLOOD PRESSURE: 134 MMHG | DIASTOLIC BLOOD PRESSURE: 57 MMHG

## 2017-05-12 VITALS — SYSTOLIC BLOOD PRESSURE: 142 MMHG | DIASTOLIC BLOOD PRESSURE: 67 MMHG

## 2017-05-12 LAB
ANION GAP SERPL CALC-SCNC: 6 MMOL/L (ref 6–14)
BUN SERPL-MCNC: 35 MG/DL (ref 7–20)
CALCIUM SERPL-MCNC: 9 MG/DL (ref 8.5–10.1)
CHLORIDE SERPL-SCNC: 100 MMOL/L (ref 98–107)
CO2 SERPL-SCNC: 33 MMOL/L (ref 21–32)
CREAT SERPL-MCNC: 3.5 MG/DL (ref 0.6–1)
ERYTHROCYTE [DISTWIDTH] IN BLOOD BY AUTOMATED COUNT: 17.5 % (ref 11.5–14.5)
GFR SERPLBLD BASED ON 1.73 SQ M-ARVRAT: 15.7 ML/MIN
GLUCOSE SERPL-MCNC: 119 MG/DL (ref 70–99)
HCT VFR BLD CALC: 27.4 % (ref 36–47)
HGB BLD-MCNC: 9.5 G/DL (ref 12–15.5)
MCH RBC QN AUTO: 35 PG (ref 25–35)
MCHC RBC AUTO-ENTMCNC: 35 G/DL (ref 31–37)
MCV RBC AUTO: 103 FL (ref 79–100)
PLATELET # BLD AUTO: 169 X10^3/UL (ref 140–400)
POTASSIUM SERPL-SCNC: 3.9 MMOL/L (ref 3.5–5.1)
RBC # BLD AUTO: 2.68 X10^6/UL (ref 3.5–5.4)
SODIUM SERPL-SCNC: 139 MMOL/L (ref 136–145)
WBC # BLD AUTO: 8.3 X10^3/UL (ref 4–11)

## 2017-05-12 RX ADMIN — CARVEDILOL SCH MG: 12.5 TABLET, FILM COATED ORAL at 17:51

## 2017-05-12 RX ADMIN — ACETAMINOPHEN PRN MG: 325 TABLET, FILM COATED ORAL at 18:04

## 2017-05-12 RX ADMIN — CARVEDILOL SCH MG: 12.5 TABLET, FILM COATED ORAL at 11:51

## 2017-05-12 RX ADMIN — FUROSEMIDE SCH MG: 20 TABLET ORAL at 11:53

## 2017-05-12 RX ADMIN — METOCLOPRAMIDE HYDROCHLORIDE SCH MG: 5 TABLET ORAL at 21:00

## 2017-05-12 RX ADMIN — LEVOTHYROXINE SODIUM SCH MCG: 137 TABLET ORAL at 06:15

## 2017-05-12 RX ADMIN — CETIRIZINE HYDROCHLORIDE SCH MG: 10 TABLET, FILM COATED ORAL at 11:56

## 2017-05-12 RX ADMIN — INSULIN DETEMIR SCH UNITS: 100 INJECTION, SOLUTION SUBCUTANEOUS at 21:09

## 2017-05-12 RX ADMIN — PANTOPRAZOLE SODIUM SCH MG: 40 TABLET, DELAYED RELEASE ORAL at 06:15

## 2017-05-12 RX ADMIN — INSULIN ASPART SCH UNITS: 100 INJECTION, SOLUTION INTRAVENOUS; SUBCUTANEOUS at 08:00

## 2017-05-12 RX ADMIN — PARICALCITOL SCH MCG: 1 CAPSULE, LIQUID FILLED ORAL at 11:56

## 2017-05-12 RX ADMIN — ALLOPURINOL SCH MG: 100 TABLET ORAL at 11:56

## 2017-05-12 RX ADMIN — LISINOPRIL SCH MG: 20 TABLET ORAL at 11:55

## 2017-05-12 RX ADMIN — ASPIRIN SCH MG: 81 TABLET, COATED ORAL at 11:52

## 2017-05-12 RX ADMIN — INSULIN ASPART SCH UNITS: 100 INJECTION, SOLUTION INTRAVENOUS; SUBCUTANEOUS at 11:32

## 2017-05-12 RX ADMIN — MULTIPLE VITAMINS W/ MINERALS TAB SCH TAB: TAB at 11:55

## 2017-05-12 RX ADMIN — PHENYTOIN SODIUM SCH MG: 100 CAPSULE ORAL at 06:15

## 2017-05-12 RX ADMIN — EXTENDED PHENYTOIN SODIUM SCH MG: 30 CAPSULE ORAL at 21:00

## 2017-05-12 RX ADMIN — INSULIN ASPART SCH UNITS: 100 INJECTION, SOLUTION INTRAVENOUS; SUBCUTANEOUS at 17:55

## 2017-05-12 RX ADMIN — PHENYTOIN SODIUM SCH MG: 100 CAPSULE ORAL at 12:03

## 2017-05-12 NOTE — PDOC
Renal-Progress Notes


Subjective Notes


Notes


BETTER





History of Present Illness


Hx of present illness


STABLE





Vitals


Vitals





Vital Signs








  Date Time  Temp Pulse Resp B/P (MAP) Pulse Ox O2 Delivery O2 Flow Rate FiO2


 


5/12/17 07:15 98.6 68 14 141/51 (81) 98 Nasal Cannula 2.0 





 98.6       








Weight


Weight [ ]





I.O.


Intake and Output











Intake and Output 


 


 5/12/17





 07:00


 


Intake Total 920 ml


 


Balance 920 ml


 


 


 


Intake Oral 920 ml


 


# Voids 3











Labs


Labs





Laboratory Tests








Test


  5/11/17


11:39 5/11/17


15:20 5/11/17


21:32 5/12/17


01:41


 


Glucose (Fingerstick)


  126 mg/dL


(70-99) 234 mg/dL


(70-99) 202 mg/dL


(70-99) 134 mg/dL


(70-99)


 


Test


  5/12/17


04:10 5/12/17


07:53 


  


 


 


White Blood Count


  8.3 x10^3/uL


(4.0-11.0) 


  


  


 


 


Red Blood Count


  2.68 x10^6/uL


(3.50-5.40) 


  


  


 


 


Hemoglobin


  9.5 g/dL


(12.0-15.5) 


  


  


 


 


Hematocrit


  27.4 %


(36.0-47.0) 


  


  


 


 


Mean Corpuscular Volume


  103 fL


() 


  


  


 


 


Mean Corpuscular Hemoglobin 35 pg (25-35)    


 


Mean Corpuscular Hemoglobin


Concent 35 g/dL


(31-37) 


  


  


 


 


Red Cell Distribution Width


  17.5 %


(11.5-14.5) 


  


  


 


 


Platelet Count


  169 x10^3/uL


(140-400) 


  


  


 


 


Sodium Level


  139 mmol/L


(136-145) 


  


  


 


 


Potassium Level


  3.9 mmol/L


(3.5-5.1) 


  


  


 


 


Chloride Level


  100 mmol/L


() 


  


  


 


 


Carbon Dioxide Level


  33 mmol/L


(21-32) 


  


  


 


 


Anion Gap 6 (6-14)    


 


Blood Urea Nitrogen


  35 mg/dL


(7-20) 


  


  


 


 


Creatinine


  3.5 mg/dL


(0.6-1.0) 


  


  


 


 


Estimated GFR


(Cockcroft-Gault) 15.7 


  


  


  


 


 


Glucose Level


  119 mg/dL


(70-99) 


  


  


 


 


Calcium Level


  9.0 mg/dL


(8.5-10.1) 


  


  


 


 


Glucose (Fingerstick)


  


  108 mg/dL


(70-99) 


  


 











Micro


Micro





Microbiology


5/8/17 Blood Culture - Preliminary, Resulted


         NO GROWTH AFTER 3 DAYS


5/8/17 Urine Culture - Final, Complete


         


5/8/17 Urine Culture Result 1 ([facility]) - Final, Complete





Review of Systems


Constitutional:  yes: weakness


Ears/Nose/Throat:  Yes: no symptom reported


Eyes:  Yes: no symptom reported


Pulmonary:  Yes no symptom reported


Cardiovascular:  Yes no symptom reported


Gastrointestional:  Yes: no symptom reported


Psychiatric/Neurological:  Yes: confusion, weakness


Endocrine:  Yes: no symptom reported





Physical Exam


General Appearance:  no apparent distress


Skin:  warm


Respiratory:  bilateral CTA


Heart:  S1S2


Abdomen:  soft, bowel sounds present


Genitourinary:  bladder flat


Extremities:  pulses present


Neurology:  alert, oriented





Assessment


Assessment


IMP





UREMIA


ANEMIA


ESRD-AGREED TO HD YESTERDAY


PROB UREMIC RELATED ENCEPHALOPATHY


HTN





PLAN





HD AGAIN TODAY


MIN UF AGAIN


INCREASE QT TO 3


UF 1 LITER


HD AGAIN IN AM


CM SETTING UP OP HD


UPDATED DAUGHTER











PHIL MARCUS MD May 12, 2017 11:28

## 2017-05-12 NOTE — PDOC
Provider Note


Provider Note


sleeping in hd- vss, bp lower so will reduce norvasc dose- fam wants snf, eval 

in progress- rest same











GODFREY RAHMAN MD May 12, 2017 08:38

## 2017-05-13 VITALS — SYSTOLIC BLOOD PRESSURE: 109 MMHG | DIASTOLIC BLOOD PRESSURE: 50 MMHG

## 2017-05-13 VITALS — DIASTOLIC BLOOD PRESSURE: 62 MMHG | SYSTOLIC BLOOD PRESSURE: 122 MMHG

## 2017-05-13 VITALS — DIASTOLIC BLOOD PRESSURE: 56 MMHG | SYSTOLIC BLOOD PRESSURE: 98 MMHG

## 2017-05-13 VITALS — DIASTOLIC BLOOD PRESSURE: 67 MMHG | SYSTOLIC BLOOD PRESSURE: 139 MMHG

## 2017-05-13 VITALS — DIASTOLIC BLOOD PRESSURE: 51 MMHG | SYSTOLIC BLOOD PRESSURE: 126 MMHG

## 2017-05-13 VITALS — SYSTOLIC BLOOD PRESSURE: 135 MMHG | DIASTOLIC BLOOD PRESSURE: 45 MMHG

## 2017-05-13 LAB
ANION GAP SERPL CALC-SCNC: 7 MMOL/L (ref 6–14)
BUN SERPL-MCNC: 31 MG/DL (ref 7–20)
CALCIUM SERPL-MCNC: 9.4 MG/DL (ref 8.5–10.1)
CHLORIDE SERPL-SCNC: 100 MMOL/L (ref 98–107)
CO2 SERPL-SCNC: 32 MMOL/L (ref 21–32)
CREAT SERPL-MCNC: 3.8 MG/DL (ref 0.6–1)
ERYTHROCYTE [DISTWIDTH] IN BLOOD BY AUTOMATED COUNT: 17.5 % (ref 11.5–14.5)
GFR SERPLBLD BASED ON 1.73 SQ M-ARVRAT: 14.3 ML/MIN
GLUCOSE SERPL-MCNC: 140 MG/DL (ref 70–99)
HCT VFR BLD CALC: 28.2 % (ref 36–47)
HGB BLD-MCNC: 9.4 G/DL (ref 12–15.5)
MCH RBC QN AUTO: 34 PG (ref 25–35)
MCHC RBC AUTO-ENTMCNC: 33 G/DL (ref 31–37)
MCV RBC AUTO: 103 FL (ref 79–100)
PLATELET # BLD AUTO: 184 X10^3/UL (ref 140–400)
POTASSIUM SERPL-SCNC: 3.7 MMOL/L (ref 3.5–5.1)
RBC # BLD AUTO: 2.74 X10^6/UL (ref 3.5–5.4)
SODIUM SERPL-SCNC: 139 MMOL/L (ref 136–145)
WBC # BLD AUTO: 7.7 X10^3/UL (ref 4–11)

## 2017-05-13 RX ADMIN — INSULIN DETEMIR SCH UNITS: 100 INJECTION, SOLUTION SUBCUTANEOUS at 21:00

## 2017-05-13 RX ADMIN — ACETAMINOPHEN PRN MG: 325 TABLET, FILM COATED ORAL at 06:30

## 2017-05-13 RX ADMIN — PANTOPRAZOLE SODIUM SCH MG: 40 TABLET, DELAYED RELEASE ORAL at 06:30

## 2017-05-13 RX ADMIN — ASPIRIN SCH MG: 81 TABLET, COATED ORAL at 07:55

## 2017-05-13 RX ADMIN — INSULIN ASPART SCH UNITS: 100 INJECTION, SOLUTION INTRAVENOUS; SUBCUTANEOUS at 11:30

## 2017-05-13 RX ADMIN — CETIRIZINE HYDROCHLORIDE SCH MG: 10 TABLET, FILM COATED ORAL at 07:56

## 2017-05-13 RX ADMIN — LISINOPRIL SCH MG: 20 TABLET ORAL at 10:20

## 2017-05-13 RX ADMIN — MULTIPLE VITAMINS W/ MINERALS TAB SCH TAB: TAB at 10:20

## 2017-05-13 RX ADMIN — PHENYTOIN SODIUM SCH MG: 100 CAPSULE ORAL at 10:21

## 2017-05-13 RX ADMIN — PARICALCITOL SCH MCG: 1 CAPSULE, LIQUID FILLED ORAL at 07:56

## 2017-05-13 RX ADMIN — FUROSEMIDE SCH MG: 20 TABLET ORAL at 10:21

## 2017-05-13 RX ADMIN — INSULIN ASPART SCH UNITS: 100 INJECTION, SOLUTION INTRAVENOUS; SUBCUTANEOUS at 07:55

## 2017-05-13 RX ADMIN — INSULIN ASPART SCH UNITS: 100 INJECTION, SOLUTION INTRAVENOUS; SUBCUTANEOUS at 17:26

## 2017-05-13 RX ADMIN — MULTIPLE VITAMINS W/ MINERALS TAB SCH TAB: TAB at 07:56

## 2017-05-13 RX ADMIN — PHENYTOIN SODIUM SCH MG: 100 CAPSULE ORAL at 06:30

## 2017-05-13 RX ADMIN — CETIRIZINE HYDROCHLORIDE SCH MG: 10 TABLET, FILM COATED ORAL at 10:21

## 2017-05-13 RX ADMIN — PARICALCITOL SCH MCG: 1 CAPSULE, LIQUID FILLED ORAL at 10:21

## 2017-05-13 RX ADMIN — BISACODYL PRN MG: 5 TABLET, COATED ORAL at 10:29

## 2017-05-13 RX ADMIN — FUROSEMIDE SCH MG: 20 TABLET ORAL at 07:55

## 2017-05-13 RX ADMIN — CARVEDILOL SCH MG: 12.5 TABLET, FILM COATED ORAL at 16:37

## 2017-05-13 RX ADMIN — LISINOPRIL SCH MG: 20 TABLET ORAL at 07:55

## 2017-05-13 RX ADMIN — CARVEDILOL SCH MG: 12.5 TABLET, FILM COATED ORAL at 10:23

## 2017-05-13 RX ADMIN — METOCLOPRAMIDE HYDROCHLORIDE SCH MG: 5 TABLET ORAL at 21:09

## 2017-05-13 RX ADMIN — OXYCODONE HYDROCHLORIDE AND ACETAMINOPHEN PRN TAB: 5; 325 TABLET ORAL at 22:58

## 2017-05-13 RX ADMIN — OXYCODONE HYDROCHLORIDE AND ACETAMINOPHEN PRN TAB: 5; 325 TABLET ORAL at 10:31

## 2017-05-13 RX ADMIN — ALLOPURINOL SCH MG: 100 TABLET ORAL at 07:56

## 2017-05-13 RX ADMIN — ACETAMINOPHEN PRN MG: 325 TABLET, FILM COATED ORAL at 00:09

## 2017-05-13 RX ADMIN — ALLOPURINOL SCH MG: 100 TABLET ORAL at 10:21

## 2017-05-13 RX ADMIN — EXTENDED PHENYTOIN SODIUM SCH MG: 30 CAPSULE ORAL at 21:09

## 2017-05-13 RX ADMIN — CARVEDILOL SCH MG: 12.5 TABLET, FILM COATED ORAL at 07:54

## 2017-05-13 RX ADMIN — ASPIRIN SCH MG: 81 TABLET, COATED ORAL at 10:21

## 2017-05-13 RX ADMIN — LEVOTHYROXINE SODIUM SCH MCG: 137 TABLET ORAL at 06:30

## 2017-05-13 NOTE — PDOC
Provider Note


Provider Note


Left upper arm incision is intact with no erythema/drainage, good thrill in the 

fistula with some normal mild post-op swelling


s/p left upper arm AV fistula transposition is patent with no signs of infection


- follow up with Dr. Sharma in 2 weeks following discharge











MANISH SHARMA MD May 13, 2017 09:33

## 2017-05-13 NOTE — PDOC
PROGRESS NOTES


Subjective


Subjective


SEEN IN FOLLOW UP OF NEW ESRD





Objective


Objective





Vital Signs








  Date Time  Temp Pulse Resp B/P (MAP) Pulse Ox O2 Delivery O2 Flow Rate FiO2


 


5/13/17 03:00 96.4 69 16 135/45 (75) 96 Room Air  





 96.4       


 


5/12/17 11:44       2.0 














Intake and Output 


 


 5/13/17





 06:59


 


Intake Total 460 ml


 


Balance 460 ml


 


 


 


Intake Oral 460 ml


 


# Bowel Movements 1











Physical Exam


Abdomen:  Normal bowel sounds, Soft, No tenderness, No hepatosplenomegaly, No 

masses


Heart:  Regular rate, Normal S1, Normal S2, No murmurs, Gallops


Extremities:  No clubbing, No cyanosis, No edema, Normal pulses, No tenderness/

swelling


General:  Alert, Oriented X3, Cooperative, No acute distress


Lungs:  Clear to auscultation, Normal air movement


Psych/Mental Status:  Mental status NL, Mood NL





Diagnosis


RENAL FAILURE:  ESRD





Assessment


Assessment


Problems


Medical Problems:


(1) Altered mental status


Status: Acute  











Plan


Plan of Care


SEEN ON DIALYSIS AND TOLERATING WELL





Comment


Review of Relevant


I have reviewed the following items adeline (where applicable) has been applied.


Labs





Laboratory Tests








Test


  5/11/17


11:39 5/11/17


15:20 5/11/17


21:32 5/12/17


01:41


 


Glucose (Fingerstick)


  126 mg/dL


(70-99) 234 mg/dL


(70-99) 202 mg/dL


(70-99) 134 mg/dL


(70-99)


 


Test


  5/12/17


04:10 5/12/17


07:53 5/12/17


11:28 5/12/17


16:30


 


White Blood Count


  8.3 x10^3/uL


(4.0-11.0) 


  


  


 


 


Red Blood Count


  2.68 x10^6/uL


(3.50-5.40) 


  


  


 


 


Hemoglobin


  9.5 g/dL


(12.0-15.5) 


  


  


 


 


Hematocrit


  27.4 %


(36.0-47.0) 


  


  


 


 


Mean Corpuscular Volume


  103 fL


() 


  


  


 


 


Mean Corpuscular Hemoglobin 35 pg (25-35)    


 


Mean Corpuscular Hemoglobin


Concent 35 g/dL


(31-37) 


  


  


 


 


Red Cell Distribution Width


  17.5 %


(11.5-14.5) 


  


  


 


 


Platelet Count


  169 x10^3/uL


(140-400) 


  


  


 


 


Sodium Level


  139 mmol/L


(136-145) 


  


  


 


 


Potassium Level


  3.9 mmol/L


(3.5-5.1) 


  


  


 


 


Chloride Level


  100 mmol/L


() 


  


  


 


 


Carbon Dioxide Level


  33 mmol/L


(21-32) 


  


  


 


 


Anion Gap 6 (6-14)    


 


Blood Urea Nitrogen


  35 mg/dL


(7-20) 


  


  


 


 


Creatinine


  3.5 mg/dL


(0.6-1.0) 


  


  


 


 


Estimated GFR


(Cockcroft-Gault) 15.7 


  


  


  


 


 


Glucose Level


  119 mg/dL


(70-99) 


  


  


 


 


Calcium Level


  9.0 mg/dL


(8.5-10.1) 


  


  


 


 


Glucose (Fingerstick)


  


  108 mg/dL


(70-99) 96 mg/dL


(70-99) 185 mg/dL


(70-99)


 


Test


  5/12/17


21:00 5/13/17


04:10 


  


 


 


Glucose (Fingerstick)


  165 mg/dL


(70-99) 


  


  


 


 


White Blood Count


  


  7.7 x10^3/uL


(4.0-11.0) 


  


 


 


Red Blood Count


  


  2.74 x10^6/uL


(3.50-5.40) 


  


 


 


Hemoglobin


  


  9.4 g/dL


(12.0-15.5) 


  


 


 


Hematocrit


  


  28.2 %


(36.0-47.0) 


  


 


 


Mean Corpuscular Volume


  


  103 fL


() 


  


 


 


Mean Corpuscular Hemoglobin  34 pg (25-35)   


 


Mean Corpuscular Hemoglobin


Concent 


  33 g/dL


(31-37) 


  


 


 


Red Cell Distribution Width


  


  17.5 %


(11.5-14.5) 


  


 


 


Platelet Count


  


  184 x10^3/uL


(140-400) 


  


 


 


Sodium Level


  


  139 mmol/L


(136-145) 


  


 


 


Potassium Level


  


  3.7 mmol/L


(3.5-5.1) 


  


 


 


Chloride Level


  


  100 mmol/L


() 


  


 


 


Carbon Dioxide Level


  


  32 mmol/L


(21-32) 


  


 


 


Anion Gap  7 (6-14)   


 


Blood Urea Nitrogen


  


  31 mg/dL


(7-20) 


  


 


 


Creatinine


  


  3.8 mg/dL


(0.6-1.0) 


  


 


 


Estimated GFR


(Cockcroft-Gault) 


  14.3 


  


  


 


 


Glucose Level


  


  140 mg/dL


(70-99) 


  


 


 


Calcium Level


  


  9.4 mg/dL


(8.5-10.1) 


  


 








Laboratory Tests








Test


  5/12/17


11:28 5/12/17


16:30 5/12/17


21:00 5/13/17


04:10


 


Glucose (Fingerstick)


  96 mg/dL


(70-99) 185 mg/dL


(70-99) 165 mg/dL


(70-99) 


 


 


White Blood Count


  


  


  


  7.7 x10^3/uL


(4.0-11.0)


 


Red Blood Count


  


  


  


  2.74 x10^6/uL


(3.50-5.40)


 


Hemoglobin


  


  


  


  9.4 g/dL


(12.0-15.5)


 


Hematocrit


  


  


  


  28.2 %


(36.0-47.0)


 


Mean Corpuscular Volume


  


  


  


  103 fL


()


 


Mean Corpuscular Hemoglobin    34 pg (25-35) 


 


Mean Corpuscular Hemoglobin


Concent 


  


  


  33 g/dL


(31-37)


 


Red Cell Distribution Width


  


  


  


  17.5 %


(11.5-14.5)


 


Platelet Count


  


  


  


  184 x10^3/uL


(140-400)


 


Sodium Level


  


  


  


  139 mmol/L


(136-145)


 


Potassium Level


  


  


  


  3.7 mmol/L


(3.5-5.1)


 


Chloride Level


  


  


  


  100 mmol/L


()


 


Carbon Dioxide Level


  


  


  


  32 mmol/L


(21-32)


 


Anion Gap    7 (6-14) 


 


Blood Urea Nitrogen


  


  


  


  31 mg/dL


(7-20)


 


Creatinine


  


  


  


  3.8 mg/dL


(0.6-1.0)


 


Estimated GFR


(Cockcroft-Gault) 


  


  


  14.3 


 


 


Glucose Level


  


  


  


  140 mg/dL


(70-99)


 


Calcium Level


  


  


  


  9.4 mg/dL


(8.5-10.1)








Microbiology


5/8/17 Blood Culture - Preliminary, Resulted


         NO GROWTH AFTER 4 DAYS


5/8/17 Urine Culture - Final, Complete


         


5/8/17 Urine Culture Result 1 ([facility]) - Final, Complete


Medications





Current Medications


Vancomycin HCl 1.25 gm/Sodium Chloride 250 ml @  166.667 mls/hr 1X  ONCE IV ;  

Start 5/8/17 at 18:15;  Stop 5/8/17 at 18:26;  Status DC


Vancomycin HCl 2 gm/Sodium Chloride 500 ml @  250 mls/hr 1X  ONCE IV  Last 

administered on 5/8/17at 19:07;  Start 5/8/17 at 19:00;  Stop 5/8/17 at 20:59;  

Status DC


Allopurinol (Zyloprim) 100 mg BID PO ;  Start 5/9/17 at 09:00;  Stop 5/9/17 at 

09:00;  Status DC


Amlodipine Besylate (Norvasc) 10 mg DAILY PO  Last administered on 5/11/17at 11:

57;  Start 5/9/17 at 09:00;  Stop 5/12/17 at 08:37;  Status DC


Aspirin (Ecotrin) 81 mg DAILY PO  Last administered on 5/12/17at 11:52;  Start 5 /9/17 at 09:00


Cetirizine HCl (Zyrtec) 10 mg DAILY PO  Last administered on 5/12/17at 11:56;  

Start 5/9/17 at 09:00


Diphenhydramine HCl (Benadryl) 25 mg DAILY PO ;  Start 5/9/17 at 09:00;  Stop 5/ 9/17 at 09:00;  Status DC


Furosemide (Lasix) 20 mg DAILY PO  Last administered on 5/12/17at 11:53;  Start 

5/9/17 at 09:00


Hydralazine HCl (Apresoline) 25 mg BID PO  Last administered on 5/12/17at 21:01

;  Start 5/9/17 at 09:00


Levothyroxine Sodium (Synthroid) 137 mcg DAILY07 PO  Last administered on 5/13/ 17at 06:30;  Start 5/9/17 at 07:00


Metoclopramide HCl (Reglan) 5 mg HS PO  Last administered on 5/12/17at 21:00;  

Start 5/9/17 at 21:00


Oxycodone/ Acetaminophen (Percocet 5/325) 1 tab PRN  Q4HRS PO ;  Start 5/8/17 

at 22:15;  Stop 5/8/17 at 22:17;  Status DC


Paricalcitol (Zemplar) 1 mcg DAILY PO  Last administered on 5/12/17at 11:56;  

Start 5/9/17 at 09:00


Phenytoin Sodium (Dilantin) 100 mg BIDACBL PO  Last administered on 5/13/17at 06

:30;  Start 5/9/17 at 07:30


Phenytoin Sodium (Dilantin) 30 mg HS PO ;  Start 5/9/17 at 21:00;  Stop 5/9/17 

at 22:14;  Status DC


Carvedilol (Coreg) 25 mg BIDWMEALS PO  Last administered on 5/12/17at 17:51;  

Start 5/9/17 at 08:00


Darbepoetin Amrik (Aranesp) 25 mcg WEEKLYHS SQ  Last administered on 5/10/17at 21

:21;  Start 5/10/17 at 21:00


Insulin Aspart (Novolog) 10 units TIDAC SQ ;  Start 5/9/17 at 07:30;  Stop 5/9/ 17 at 08:44;  Status DC


Insulin Detemir (Levemir) 75 units QHS SQ ;  Start 5/8/17 at 22:30;  Stop 5/11/ 17 at 08:55;  Status DC


Non-Formulary Medication 10 unit TIDAC SQ ;  Start 5/9/17 at 07:30;  Status UNV


Multivitamins (Thera M Plus) 1 tab DAILY PO ;  Start 5/9/17 at 09:00


Pantoprazole Sodium (Protonix) 40 mg DAILYAC PO  Last administered on 5/13/17at 

06:30;  Start 5/9/17 at 09:00


Oxycodone/ Acetaminophen (Percocet 5/325) 2 tab PRN Q4HRS  PRN PO MODERATE TO 

SEVERE PAIN;  Start 5/8/17 at 22:30


Oxycodone/ Acetaminophen (Percocet 5/325) 1 tab PRN Q4HRS  PRN PO MILD TO 

MODERATE PAIN Last administered on 5/11/17at 10:10;  Start 5/8/17 at 22:30


Allopurinol (Zyloprim) 100 mg DAILY10 PO  Last administered on 5/12/17at 11:56;

  Start 5/9/17 at 10:00


Insulin Aspart (Novolog) 0-7 UNITS TIDWMEALS SQ  Last administered on 5/12/17at 

17:55;  Start 5/9/17 at 12:00


Dextrose (Dextrose 50%-Water Syringe) 12.5 gm PRN Q15MIN  PRN IV SEE COMMENTS;  

Start 5/9/17 at 08:45


Ibuprofen (Motrin) 400 mg PRN Q6HRS  PRN PO INFLAMMATION;  Start 5/9/17 at 22:00

;  Stop 5/10/17 at 11:46;  Status DC


Phenytoin Sodium (Dilantin) 30 mg HS PO  Last administered on 5/12/17at 21:00;  

Start 5/9/17 at 22:30


Heparin Sodium (Porcine) (Heparin Sodium) 10,000 unit STK-MED ONCE .ROUTE ;  

Start 5/10/17 at 08:16;  Stop 5/10/17 at 08:17;  Status DC


Lidocaine/ Epinephrine (Xylocaine 1%-Epi 1:100,000) 20 ml STK-MED ONCE .ROUTE ;

  Start 5/10/17 at 08:16;  Stop 5/10/17 at 08:17;  Status DC


Heparin Sodium/ Sodium Chloride 500 ml @  As Directed STK-MED ONCE .ROUTE ;  

Start 5/10/17 at 08:16;  Stop 5/10/17 at 08:17;  Status DC


Midazolam HCl (Versed) 5 mg STK-MED ONCE .ROUTE ;  Start 5/10/17 at 13:48;  

Stop 5/10/17 at 13:49;  Status DC


Fentanyl Citrate (Fentanyl 5ml Vial) 250 mcg STK-MED ONCE .ROUTE ;  Start 5/10/

17 at 13:48;  Stop 5/10/17 at 13:49;  Status DC


Cefazolin Sodium 50 ml @ As Directed STK-MED ONCE IV ;  Start 5/10/17 at 13:48;

  Stop 5/10/17 at 13:49;  Status DC


Heparin Sodium (Porcine) (Heparin Sodium) 10,000 unit STK-MED ONCE .ROUTE ;  

Start 5/10/17 at 13:49;  Stop 5/10/17 at 13:50;  Status DC


Lidocaine/ Epinephrine (Xylocaine 1%-Epi 1:100,000) 20 ml STK-MED ONCE .ROUTE ;

  Start 5/10/17 at 13:49;  Stop 5/10/17 at 13:50;  Status DC


Heparin Sodium/ Sodium Chloride 500 ml @  As Directed STK-MED ONCE .ROUTE ;  

Start 5/10/17 at 13:49;  Stop 5/10/17 at 13:50;  Status DC


Clindamycin Phosphate 50 ml @ As Directed STK-MED ONCE IV ;  Start 5/10/17 at 13

:52;  Stop 5/10/17 at 13:53;  Status DC


Heparin Sodium (Porcine) (Heparin Sodium) 2,500 unit 1X  ONCE IART ;  Start 5/10

/17 at 14:00;  Stop 5/10/17 at 14:09;  Status DC


Midazolam HCl (Versed) 5 mg 1X  ONCE IV  Last administered on 5/10/17at 14:29;  

Start 5/10/17 at 14:00;  Stop 5/10/17 at 14:09;  Status DC


Fentanyl Citrate (Fentanyl 5ml Vial) 250 mcg 1X  ONCE IV  Last administered on 5

/10/17at 14:30;  Start 5/10/17 at 14:00;  Stop 5/10/17 at 14:09;  Status DC


Lidocaine/ Epinephrine (Xylocaine 1%-Epi 1:100,000) 20 ml 1X  ONCE INJ  Last 

administered on 5/10/17at 14:29;  Start 5/10/17 at 14:00;  Stop 5/10/17 at 14:09

;  Status DC


Clindamycin Phosphate 50 ml @  100 mls/hr 1X  ONCE IV  Last administered on 5/10

/17at 14:15;  Start 5/10/17 at 14:15;  Stop 5/10/17 at 14:44;  Status DC


Heparin Sodium/ Sodium Chloride 1,000 unit 1X  ONCE IART  Last administered on 5

/10/17at 14:29;  Start 5/10/17 at 14:15;  Stop 5/10/17 at 14:16;  Status DC


Heparin Sodium (Porcine) (Heparin Sodium) 4,300 unit 1X  ONCE IV  Last 

administered on 5/10/17at 14:30;  Start 5/10/17 at 14:15;  Stop 5/10/17 at 14:16

;  Status DC


Sodium Chloride 1,000 ml @  1,000 mls/hr Q1H PRN IV hypotension;  Start 5/10/17 

at 15:13;  Stop 5/10/17 at 21:12;  Status DC


Info (PHARMACY MONITORING -- do not chart) 1 each PRN DAILY  PRN MC SEE COMMENTS

;  Start 5/10/17 at 15:15


Info (PHARMACY MONITORING -- do not chart) 1 each PRN DAILY  PRN MC SEE COMMENTS

;  Start 5/10/17 at 15:15;  Status UNV


Sodium Chloride 1,000 ml @  1,000 mls/hr Q1H PRN IV hypotension;  Start 5/11/17 

at 06:55;  Stop 5/11/17 at 12:54;  Status DC


Diphenhydramine HCl (Benadryl) 25 mg 1X PRN  PRN IV ITCHING;  Start 5/11/17 at 

07:00;  Stop 5/12/17 at 06:59;  Status DC


Diphenhydramine HCl (Benadryl) 25 mg 1X PRN  PRN IV ITCHING;  Start 5/11/17 at 

07:00;  Stop 5/12/17 at 06:59;  Status DC


Sodium Chloride 1,000 ml @  400 mls/hr Q2H30M PRN IV PATENCY;  Start 5/11/17 at 

06:55;  Stop 5/11/17 at 18:54;  Status DC


Info (PHARMACY MONITORING -- do not chart) 1 each PRN DAILY  PRN MC SEE COMMENTS

;  Start 5/11/17 at 07:00;  Status UNV


Insulin Detemir (Levemir) 60 units QHS SQ  Last administered on 5/11/17at 21:43

;  Start 5/11/17 at 21:00;  Stop 5/12/17 at 08:32;  Status DC


Lisinopril (Prinivil) 20 mg DAILY PO  Last administered on 5/11/17at 13:25;  

Start 5/11/17 at 11:30


Acetaminophen (Tylenol) 650 mg PRN Q6HRS  PRN PO MILD PAIN / TEMP Last 

administered on 5/13/17at 06:30;  Start 5/11/17 at 20:30


Sodium Chloride 1,000 ml @  1,000 mls/hr Q1H PRN IV hypotension;  Start 5/12/17 

at 07:31;  Stop 5/12/17 at 13:30;  Status DC


Info (PHARMACY MONITORING -- do not chart) 1 each PRN DAILY  PRN MC SEE COMMENTS

;  Start 5/12/17 at 07:45;  Status UNV


Info (PHARMACY MONITORING -- do not chart) 1 each PRN DAILY  PRN MC SEE COMMENTS

;  Start 5/12/17 at 07:45;  Status UNV


Insulin Detemir (Levemir) 65 units QHS SQ  Last administered on 5/12/17at 21:09

;  Start 5/12/17 at 21:00


Amlodipine Besylate (Norvasc) 5 mg DAILY PO  Last administered on 5/12/17at 11:

53;  Start 5/12/17 at 09:00


Sodium Chloride 1,000 ml @  1,000 mls/hr Q1H PRN IV hypotension;  Start 5/13/17 

at 08:00;  Stop 5/13/17 at 15:00


Info (PHARMACY MONITORING -- do not chart) 1 each PRN DAILY  PRN MC SEE COMMENTS

;  Start 5/13/17 at 08:00;  Status UNV


Info (PHARMACY MONITORING -- do not chart) 1 each PRN DAILY  PRN MC SEE COMMENTS

;  Start 5/13/17 at 08:00;  Status UNV





Active Scripts


Active


Reported


Humalog (Insulin Lispro) 100 Unit/1 Ml Insuln.pen 10 Unit SQ TIDAC


Lantus Solostar (Insulin Glargine,Hum.rec.anlog) 100 Unit/1 Ml Insuln.pen 75 

Unit SQ QHS


Dilantin (Phenytoin Sodium Extended) 100 Mg Capsule 30 Mg PO HS


Dilantin (Phenytoin Sodium Extended) 100 Mg Capsule 1 Cap PO BIDACBL


Zemplar (Paricalcitol) 1 Mcg Capsule 1 Mcg PO DAILY


Omeprazole 20 Mg Capsule.dr 1 Cap PO DAILY


Multi-Day Vitamins (Multivitamin) 1 Each Tablet 1 Tab PO DAILY


Reglan (Metoclopramide Hcl) 10 Mg Tablet 0.5 Tab PO HS


Levothyroxine Sodium 137 Mcg Tablet 1 Tab PO DAILY


Novolog (Insulin Aspart) 100 Unit/1 Ml Cartridge 10 Unit SQ TIDAC


Hydralazine Hcl 25 Mg Tablet 1 Tab PO BID


Percocet 5-325 Mg Tablet (Oxycodone/Acetaminophen) 1 Each Tablet 1-2 Tab PO Q4-

6HRS


Epogen (Epoetin Amrik) 2,000 Unit/1 Ml Vial 2,000 Unit IJ WEEKLY


Synthroid (Levothyroxine Sodium) 137 Mcg Tablet 1 Tab PO DAILY


Zyrtec (Cetirizine Hcl) 10 Mg Tablet 1 Tab PO DAILY


Benadryl (Diphenhydramine Hcl) 25 Mg Capsule 25 Mg PO DAILY


Aspir 81 (Aspirin) 81 Mg Tablet. 81 Mg PO DAILY


Allopurinol 100 Mg Tablet 100 Mg PO DAILY


Carvedilol 25 Mg Tablet 25 Mg PO BIDWMEALS


Omeprazole 20 Mg Tablet. 20 Mg PO DAILY


Losartan Potassium 100 Mg Tablet 100 Mg PO DAILY


Amlodipine Besylate 10 Mg Tablet 10 Mg PO DAILY


Furosemide 20 Mg Tablet 20 Mg PO DAILY


Novolog (Insulin Aspart) 100 Unit/1 Ml Vial 12 Unit SQ TIDAC


Lantus (Insulin Glargine,Hum.rec.anlog) 100 Unit/1 Ml Vial 70 Unit SQ HS


Dilantin (Phenytoin Sodium Extended) 100 Mg Capsule 200 Mg PO DAILY


Dilantin (Phenytoin Sodium Extended) 30 Mg Capsule 60 Mg PO DAILY


Vitals/I & O





Vital Sign - Last 24 Hours








 5/12/17 5/12/17 5/12/17 5/12/17





 11:44 11:51 11:52 11:53


 


Temp 98.2   





 98.2   


 


Pulse 68 68 68 68


 


Resp 14   


 


B/P (MAP) 115/52 (73) 115/52 115/52 115/52


 


Pulse Ox 98   


 


O2 Delivery Nasal Cannula   


 


O2 Flow Rate 2.0   


 


    





    





 5/12/17 5/12/17 5/12/17 5/12/17





 11:55 15:00 17:51 19:00


 


Temp  97.7  98.6





  97.7  98.6


 


Pulse 68 75 75 69


 


Resp  16  16


 


B/P (MAP) 115/52 134/57 (82) 134/57 119/58 (78)


 


Pulse Ox  95  95


 


O2 Delivery  Room Air  Room Air


 


    





    





 5/12/17 5/12/17 5/12/17 5/13/17





 20:00 21:01 23:00 03:00


 


Temp   98.6 96.4





   98.6 96.4


 


Pulse  69 70 69


 


Resp   16 16


 


B/P (MAP)  119/58 142/47 (78) 135/45 (75)


 


Pulse Ox   91 96


 


O2 Delivery Room Air  Room Air Room Air














Intake and Output   


 


 5/12/17 5/12/17 5/13/17





 14:59 22:59 06:59


 


Intake Total 120 ml 240 ml 100 ml


 


Balance 120 ml 240 ml 100 ml

















TERRELL CABRERA MD May 13, 2017 08:21

## 2017-05-13 NOTE — PDOC
GENERAL


General:


vss and afebrile. receiving dialysis as I saw this am. main complaint is 

constipation and will order meds for same. mental status seems good this am. 

chest clear and heart regular.


Problems:  





VITAL SIGNS


Vital Signs:





Vital Signs








  Date Time  Temp Pulse Resp B/P (MAP) Pulse Ox O2 Delivery O2 Flow Rate FiO2


 


5/13/17 07:16      Nasal Cannula  


 


5/13/17 03:00 96.4 69 16 135/45 (75) 96   





 96.4       


 


5/12/17 11:44       2.0 











I & O


I & O











Intake and Output 


 


 5/13/17





 07:00


 


Intake Total 460 ml


 


Balance 460 ml


 


 


 


Intake Oral 460 ml


 


# Bowel Movements 1











ALLERGIES


Allergies:





Allergies








Coded Allergies Type Severity Reaction Last Updated Verified


 


  Penicillins Allergy Intermediate  10/13/16 Yes


 


  Sulfa (Sulfonamide Antibiotics) Allergy Intermediate  10/13/16 Yes


 


  adhesive tape Allergy Intermediate  5/8/17 Yes


 


  codeine Allergy Intermediate  10/13/16 Yes


 


  doxycycline Allergy Intermediate  10/13/16 Yes











MEDS


Medications:





Current Medications








 Medications


  (Trade)  Dose


 Ordered  Sig/Alisha  Start Time


 Stop Time Status Last Admin


Dose Admin


 


 Acetaminophen


  (Tylenol)  650 mg  PRN Q6HRS  PRN  5/11/17 20:30


    5/13/17 06:30


650 MG


 


 Allopurinol


  (Zyloprim)  100 mg  DAILY10  5/9/17 10:00


    5/12/17 11:56


100 MG


 


 Amlodipine


 Besylate


  (Norvasc)  5 mg  DAILY  5/12/17 09:00


    5/12/17 11:53


5 MG


 


 Aspirin


  (Ecotrin)  81 mg  DAILY  5/9/17 09:00


    5/12/17 11:52


81 MG


 


 Carvedilol


  (Coreg)  25 mg  BIDWMEALS  5/9/17 08:00


    5/12/17 17:51


25 MG


 


 Cefazolin Sodium  50 ml @ As


 Directed  STK-MED ONCE  5/10/17 13:48


 5/10/17 13:49 DC  


 


 


 Cetirizine HCl


  (Zyrtec)  10 mg  DAILY  5/9/17 09:00


    5/12/17 11:56


10 MG


 


 Clindamycin


 Phosphate  50 ml @ 


 100 mls/hr  1X  ONCE  5/10/17 14:15


 5/10/17 14:44 DC 5/10/17 14:15


100 MLS/HR


 


 Darbepoetin Amrik


  (Aranesp)  25 mcg  WEEKLYHS  5/10/17 21:00


    5/10/17 21:21


25 MCG


 


 Dextrose


  (Dextrose


 50%-Water Syringe)  12.5 gm  PRN Q15MIN  PRN  5/9/17 08:45


     


 


 


 Diphenhydramine


 HCl


  (Benadryl)  25 mg  1X PRN  PRN  5/11/17 07:00


 5/12/17 06:59 DC  


 


 


 Fentanyl Citrate


  (Fentanyl 5ml


 Vial)  250 mcg  1X  ONCE  5/10/17 14:00


 5/10/17 14:09 DC 5/10/17 14:30


250 MCG


 


 Furosemide


  (Lasix)  20 mg  DAILY  5/9/17 09:00


    5/12/17 11:53


20 MG


 


 Heparin Sodium


  (Porcine)


  (Heparin Sodium)  4,300 unit  1X  ONCE  5/10/17 14:15


 5/10/17 14:16 DC 5/10/17 14:30


4,300 UNIT


 


 Heparin Sodium/


 Sodium Chloride  1,000 unit  1X  ONCE  5/10/17 14:15


 5/10/17 14:16 DC 5/10/17 14:29


1,000 UNIT


 


 Hydralazine HCl


  (Apresoline)  25 mg  BID  5/9/17 09:00


    5/12/17 21:01


25 MG


 


 Ibuprofen


  (Motrin)  400 mg  PRN Q6HRS  PRN  5/9/17 22:00


 5/10/17 11:46 DC  


 


 


 Info


  (PHARMACY


 MONITORING -- do


 not chart)  1 each  PRN DAILY  PRN  5/13/17 08:00


   UNV  


 


 


 Insulin Aspart


  (Novolog)  0-7 UNITS  TIDWMEALS  5/9/17 12:00


    5/12/17 17:55


3 UNITS


 


 Insulin Detemir


  (Levemir)  65 units  QHS  5/12/17 21:00


    5/12/17 21:09


65 UNITS


 


 Levothyroxine


 Sodium


  (Synthroid)  137 mcg  DAILY07  5/9/17 07:00


    5/13/17 06:30


137 MCG


 


 Lidocaine/


 Epinephrine


  (Xylocaine


 1%-Epi 1:100,000)  20 ml  1X  ONCE  5/10/17 14:00


 5/10/17 14:09 DC 5/10/17 14:29


20 ML


 


 Lisinopril


  (Prinivil)  20 mg  DAILY  5/11/17 11:30


    5/11/17 13:25


20 MG


 


 Metoclopramide HCl


  (Reglan)  5 mg  HS  5/9/17 21:00


    5/12/17 21:00


5 MG


 


 Midazolam HCl


  (Versed)  5 mg  1X  ONCE  5/10/17 14:00


 5/10/17 14:09 DC 5/10/17 14:29


2 MG


 


 Multivitamins


  (Thera M Plus)  1 tab  DAILY  5/9/17 09:00


     


 


 


 Non-Formulary


 Medication  10 unit  TIDAC  5/9/17 07:30


   UNV  


 


 


 Oxycodone/


 Acetaminophen


  (Percocet 5/325)  1 tab  PRN Q4HRS  PRN  5/8/17 22:30


    5/11/17 10:10


1 TAB


 


 Pantoprazole


 Sodium


  (Protonix)  40 mg  DAILYAC  5/9/17 09:00


    5/13/17 06:30


40 MG


 


 Paricalcitol


  (Zemplar)  1 mcg  DAILY  5/9/17 09:00


    5/12/17 11:56


1 MCG


 


 Phenytoin Sodium


  (Dilantin)  30 mg  HS  5/9/17 22:30


    5/12/17 21:00


30 MG


 


 Sodium Chloride  1,000 ml @ 


 1,000 mls/hr  Q1H PRN  5/13/17 08:00


 5/13/17 15:00   


 


 


 Vancomycin HCl


 1.25 gm/Sodium


 Chloride  250 ml @ 


 166.667


 mls/hr  1X  ONCE  5/8/17 18:15


 5/8/17 18:26 DC  


 


 


 Vancomycin HCl 2


 gm/Sodium Chloride  500 ml @ 


 250 mls/hr  1X  ONCE  5/8/17 19:00


 5/8/17 20:59 DC 5/8/17 19:07


250 MLS/HR











LAB


Lab:





Laboratory Tests








Test


  5/12/17


11:28 5/12/17


16:30 5/12/17


21:00 5/13/17


04:10


 


Glucose (Fingerstick)


  96 mg/dL


(70-99) 185 mg/dL


(70-99) 165 mg/dL


(70-99) 


 


 


White Blood Count


  


  


  


  7.7 x10^3/uL


(4.0-11.0)


 


Red Blood Count


  


  


  


  2.74 x10^6/uL


(3.50-5.40)


 


Hemoglobin


  


  


  


  9.4 g/dL


(12.0-15.5)


 


Hematocrit


  


  


  


  28.2 %


(36.0-47.0)


 


Mean Corpuscular Volume


  


  


  


  103 fL


()


 


Mean Corpuscular Hemoglobin    34 pg (25-35) 


 


Mean Corpuscular Hemoglobin


Concent 


  


  


  33 g/dL


(31-37)


 


Red Cell Distribution Width


  


  


  


  17.5 %


(11.5-14.5)


 


Platelet Count


  


  


  


  184 x10^3/uL


(140-400)


 


Sodium Level


  


  


  


  139 mmol/L


(136-145)


 


Potassium Level


  


  


  


  3.7 mmol/L


(3.5-5.1)


 


Chloride Level


  


  


  


  100 mmol/L


()


 


Carbon Dioxide Level


  


  


  


  32 mmol/L


(21-32)


 


Anion Gap    7 (6-14) 


 


Blood Urea Nitrogen


  


  


  


  31 mg/dL


(7-20)


 


Creatinine


  


  


  


  3.8 mg/dL


(0.6-1.0)


 


Estimated GFR


(Cockcroft-Gault) 


  


  


  14.3 


 


 


Glucose Level


  


  


  


  140 mg/dL


(70-99)


 


Calcium Level


  


  


  


  9.4 mg/dL


(8.5-10.1)

















INOCENCIA GILLETTE MD May 13, 2017 09:45

## 2017-05-14 VITALS — DIASTOLIC BLOOD PRESSURE: 63 MMHG | SYSTOLIC BLOOD PRESSURE: 117 MMHG

## 2017-05-14 VITALS — DIASTOLIC BLOOD PRESSURE: 46 MMHG | SYSTOLIC BLOOD PRESSURE: 107 MMHG

## 2017-05-14 VITALS — SYSTOLIC BLOOD PRESSURE: 121 MMHG | DIASTOLIC BLOOD PRESSURE: 63 MMHG

## 2017-05-14 VITALS — SYSTOLIC BLOOD PRESSURE: 133 MMHG | DIASTOLIC BLOOD PRESSURE: 59 MMHG

## 2017-05-14 VITALS — DIASTOLIC BLOOD PRESSURE: 73 MMHG | SYSTOLIC BLOOD PRESSURE: 118 MMHG

## 2017-05-14 VITALS — DIASTOLIC BLOOD PRESSURE: 64 MMHG | SYSTOLIC BLOOD PRESSURE: 124 MMHG

## 2017-05-14 RX ADMIN — BISACODYL PRN MG: 5 TABLET, COATED ORAL at 11:16

## 2017-05-14 RX ADMIN — ASPIRIN SCH MG: 81 TABLET, COATED ORAL at 08:23

## 2017-05-14 RX ADMIN — PARICALCITOL SCH MCG: 1 CAPSULE, LIQUID FILLED ORAL at 08:23

## 2017-05-14 RX ADMIN — OXYCODONE HYDROCHLORIDE AND ACETAMINOPHEN PRN TAB: 5; 325 TABLET ORAL at 21:07

## 2017-05-14 RX ADMIN — INSULIN DETEMIR SCH UNITS: 100 INJECTION, SOLUTION SUBCUTANEOUS at 21:05

## 2017-05-14 RX ADMIN — ACETAMINOPHEN PRN MG: 325 TABLET, FILM COATED ORAL at 23:17

## 2017-05-14 RX ADMIN — FUROSEMIDE SCH MG: 20 TABLET ORAL at 08:22

## 2017-05-14 RX ADMIN — MULTIPLE VITAMINS W/ MINERALS TAB SCH TAB: TAB at 08:22

## 2017-05-14 RX ADMIN — CARVEDILOL SCH MG: 12.5 TABLET, FILM COATED ORAL at 08:29

## 2017-05-14 RX ADMIN — INSULIN ASPART SCH UNITS: 100 INJECTION, SOLUTION INTRAVENOUS; SUBCUTANEOUS at 12:08

## 2017-05-14 RX ADMIN — INSULIN ASPART SCH UNITS: 100 INJECTION, SOLUTION INTRAVENOUS; SUBCUTANEOUS at 08:00

## 2017-05-14 RX ADMIN — ALLOPURINOL SCH MG: 100 TABLET ORAL at 08:23

## 2017-05-14 RX ADMIN — PANTOPRAZOLE SODIUM SCH MG: 40 TABLET, DELAYED RELEASE ORAL at 08:29

## 2017-05-14 RX ADMIN — LEVOTHYROXINE SODIUM SCH MCG: 137 TABLET ORAL at 06:21

## 2017-05-14 RX ADMIN — METOCLOPRAMIDE HYDROCHLORIDE SCH MG: 5 TABLET ORAL at 20:57

## 2017-05-14 RX ADMIN — PHENYTOIN SODIUM SCH MG: 100 CAPSULE ORAL at 08:22

## 2017-05-14 RX ADMIN — ACETAMINOPHEN PRN MG: 325 TABLET, FILM COATED ORAL at 08:29

## 2017-05-14 RX ADMIN — LISINOPRIL SCH MG: 20 TABLET ORAL at 08:28

## 2017-05-14 RX ADMIN — PHENYTOIN SODIUM SCH MG: 100 CAPSULE ORAL at 11:16

## 2017-05-14 RX ADMIN — CARVEDILOL SCH MG: 12.5 TABLET, FILM COATED ORAL at 16:27

## 2017-05-14 RX ADMIN — INSULIN ASPART SCH UNITS: 100 INJECTION, SOLUTION INTRAVENOUS; SUBCUTANEOUS at 17:17

## 2017-05-14 RX ADMIN — EXTENDED PHENYTOIN SODIUM SCH MG: 30 CAPSULE ORAL at 20:58

## 2017-05-14 RX ADMIN — CETIRIZINE HYDROCHLORIDE SCH MG: 10 TABLET, FILM COATED ORAL at 08:23

## 2017-05-14 NOTE — PDOC
GENERAL


General:


vss and tmax 99.5. resting quietly but awakens and seems appropriate. Hb 9.4 

and good sugars. exam stable. continue same.


Problems:  





VITAL SIGNS


Vital Signs:





Vital Signs








  Date Time  Temp Pulse Resp B/P (MAP) Pulse Ox O2 Delivery O2 Flow Rate FiO2


 


5/14/17 08:29  70  118/73    


 


5/14/17 08:00      Room Air  


 


5/14/17 07:00 97.9  16  97   





 97.9       


 


5/13/17 23:58       2.0 











I & O


I & O











Intake and Output 


 


 5/14/17





 07:00


 


Intake Total 100 ml


 


Balance 100 ml


 


 


 


Intake Oral 100 ml


 


# Voids 4


 


# Bowel Movements 1











ALLERGIES


Allergies:





Allergies








Coded Allergies Type Severity Reaction Last Updated Verified


 


  Penicillins Allergy Intermediate  10/13/16 Yes


 


  Sulfa (Sulfonamide Antibiotics) Allergy Intermediate  10/13/16 Yes


 


  adhesive tape Allergy Intermediate  5/8/17 Yes


 


  codeine Allergy Intermediate  10/13/16 Yes


 


  doxycycline Allergy Intermediate  10/13/16 Yes











MEDS


Medications:





Current Medications








 Medications


  (Trade)  Dose


 Ordered  Sig/Alisha  Start Time


 Stop Time Status Last Admin


Dose Admin


 


 Acetaminophen


  (Tylenol)  650 mg  PRN Q6HRS  PRN  5/11/17 20:30


    5/14/17 08:29


650 MG


 


 Allopurinol


  (Zyloprim)  100 mg  DAILY10  5/9/17 10:00


    5/14/17 08:23


100 MG


 


 Amlodipine


 Besylate


  (Norvasc)  5 mg  DAILY  5/12/17 09:00


    5/14/17 08:29


5 MG


 


 Aspirin


  (Ecotrin)  81 mg  DAILY  5/9/17 09:00


    5/14/17 08:23


81 MG


 


 Bisacodyl


  (Dulcolax Tab)  10 mg  PRN DAILY  PRN  5/13/17 09:45


    5/14/17 11:16


10 MG


 


 Carvedilol


  (Coreg)  25 mg  BIDWMEALS  5/9/17 08:00


    5/14/17 08:29


25 MG


 


 Cefazolin Sodium  50 ml @ As


 Directed  STK-MED ONCE  5/10/17 13:48


 5/10/17 13:49 DC  


 


 


 Cetirizine HCl


  (Zyrtec)  10 mg  DAILY  5/9/17 09:00


    5/14/17 08:23


10 MG


 


 Clindamycin


 Phosphate  50 ml @ 


 100 mls/hr  1X  ONCE  5/10/17 14:15


 5/10/17 14:44 DC 5/10/17 14:15


100 MLS/HR


 


 Darbepoetin Amrik


  (Aranesp)  25 mcg  WEEKLYHS  5/10/17 21:00


    5/10/17 21:21


25 MCG


 


 Dextrose


  (Dextrose


 50%-Water Syringe)  12.5 gm  PRN Q15MIN  PRN  5/9/17 08:45


     


 


 


 Diphenhydramine


 HCl


  (Benadryl)  25 mg  1X PRN  PRN  5/11/17 07:00


 5/12/17 06:59 DC  


 


 


 Fentanyl Citrate


  (Fentanyl 5ml


 Vial)  250 mcg  1X  ONCE  5/10/17 14:00


 5/10/17 14:09 DC 5/10/17 14:30


250 MCG


 


 Furosemide


  (Lasix)  20 mg  DAILY  5/9/17 09:00


    5/14/17 08:22


20 MG


 


 Heparin Sodium


  (Porcine)


  (Heparin Sodium)  4,300 unit  1X  ONCE  5/10/17 14:15


 5/10/17 14:16 DC 5/10/17 14:30


4,300 UNIT


 


 Heparin Sodium/


 Sodium Chloride  1,000 unit  1X  ONCE  5/10/17 14:15


 5/10/17 14:16 DC 5/10/17 14:29


1,000 UNIT


 


 Hydralazine HCl


  (Apresoline)  25 mg  BID  5/9/17 09:00


    5/14/17 08:28


25 MG


 


 Ibuprofen


  (Motrin)  400 mg  PRN Q6HRS  PRN  5/9/17 22:00


 5/10/17 11:46 DC  


 


 


 Info


  (PHARMACY


 MONITORING -- do


 not chart)  1 each  PRN DAILY  PRN  5/13/17 08:00


   UNV  


 


 


 Insulin Aspart


  (Novolog)  0-7 UNITS  TIDWMEALS  5/9/17 12:00


    5/13/17 17:26


4 UNITS


 


 Insulin Detemir


  (Levemir)  65 units  QHS  5/12/17 21:00


    5/12/17 21:09


65 UNITS


 


 Levothyroxine


 Sodium


  (Synthroid)  137 mcg  DAILY07  5/9/17 07:00


    5/14/17 06:21


137 MCG


 


 Lidocaine/


 Epinephrine


  (Xylocaine


 1%-Epi 1:100,000)  20 ml  1X  ONCE  5/10/17 14:00


 5/10/17 14:09 DC 5/10/17 14:29


20 ML


 


 Lisinopril


  (Prinivil)  20 mg  DAILY  5/11/17 11:30


    5/14/17 08:28


20 MG


 


 Metoclopramide HCl


  (Reglan)  5 mg  HS  5/9/17 21:00


    5/13/17 21:09


5 MG


 


 Midazolam HCl


  (Versed)  5 mg  1X  ONCE  5/10/17 14:00


 5/10/17 14:09 DC 5/10/17 14:29


2 MG


 


 Multivitamins


  (Thera M Plus)  1 tab  DAILY  5/9/17 09:00


    5/14/17 08:22


1 TAB


 


 Non-Formulary


 Medication  10 unit  TIDAC  5/9/17 07:30


   UNV  


 


 


 Oxycodone/


 Acetaminophen


  (Percocet 5/325)  1 tab  PRN Q4HRS  PRN  5/8/17 22:30


    5/13/17 22:58


1 TAB


 


 Pantoprazole


 Sodium


  (Protonix)  40 mg  DAILYAC  5/9/17 09:00


    5/14/17 08:29


40 MG


 


 Paricalcitol


  (Zemplar)  1 mcg  DAILY  5/9/17 09:00


    5/14/17 08:23


1 MCG


 


 Phenytoin Sodium


  (Dilantin)  30 mg  HS  5/9/17 22:30


    5/13/17 21:09


30 MG


 


 Sodium Chloride  1,000 ml @ 


 1,000 mls/hr  Q1H PRN  5/13/17 08:00


 5/13/17 15:00 DC  


 


 


 Vancomycin HCl


 1.25 gm/Sodium


 Chloride  250 ml @ 


 166.667


 mls/hr  1X  ONCE  5/8/17 18:15


 5/8/17 18:26 DC  


 


 


 Vancomycin HCl 2


 gm/Sodium Chloride  500 ml @ 


 250 mls/hr  1X  ONCE  5/8/17 19:00


 5/8/17 20:59 DC 5/8/17 19:07


250 MLS/HR











LAB


Lab:





Laboratory Tests








Test


  5/13/17


16:38 5/13/17


20:14 5/14/17


07:28


 


Glucose (Fingerstick)


  211 mg/dL


(70-99) 123 mg/dL


(70-99) 145 mg/dL


(70-99)

















INOCENCIA GILLETTE MD May 14, 2017 11:45

## 2017-05-15 VITALS — SYSTOLIC BLOOD PRESSURE: 117 MMHG | DIASTOLIC BLOOD PRESSURE: 44 MMHG

## 2017-05-15 VITALS
DIASTOLIC BLOOD PRESSURE: 67 MMHG | DIASTOLIC BLOOD PRESSURE: 67 MMHG | SYSTOLIC BLOOD PRESSURE: 140 MMHG | SYSTOLIC BLOOD PRESSURE: 140 MMHG

## 2017-05-15 VITALS — DIASTOLIC BLOOD PRESSURE: 42 MMHG | SYSTOLIC BLOOD PRESSURE: 119 MMHG

## 2017-05-15 VITALS — SYSTOLIC BLOOD PRESSURE: 113 MMHG | DIASTOLIC BLOOD PRESSURE: 56 MMHG

## 2017-05-15 LAB
ANION GAP SERPL CALC-SCNC: 12 MMOL/L (ref 6–14)
BUN SERPL-MCNC: 48 MG/DL (ref 7–20)
CALCIUM SERPL-MCNC: 9.7 MG/DL (ref 8.5–10.1)
CHLORIDE SERPL-SCNC: 102 MMOL/L (ref 98–107)
CO2 SERPL-SCNC: 25 MMOL/L (ref 21–32)
CREAT SERPL-MCNC: 5.3 MG/DL (ref 0.6–1)
GFR SERPLBLD BASED ON 1.73 SQ M-ARVRAT: 9.7 ML/MIN
GLUCOSE SERPL-MCNC: 119 MG/DL (ref 70–99)
POTASSIUM SERPL-SCNC: 4 MMOL/L (ref 3.5–5.1)
SODIUM SERPL-SCNC: 139 MMOL/L (ref 136–145)

## 2017-05-15 RX ADMIN — CETIRIZINE HYDROCHLORIDE SCH MG: 10 TABLET, FILM COATED ORAL at 08:16

## 2017-05-15 RX ADMIN — LISINOPRIL SCH MG: 20 TABLET ORAL at 08:16

## 2017-05-15 RX ADMIN — LEVOTHYROXINE SODIUM SCH MCG: 137 TABLET ORAL at 06:24

## 2017-05-15 RX ADMIN — CARVEDILOL SCH MG: 12.5 TABLET, FILM COATED ORAL at 17:53

## 2017-05-15 RX ADMIN — CARVEDILOL SCH MG: 12.5 TABLET, FILM COATED ORAL at 08:16

## 2017-05-15 RX ADMIN — PANTOPRAZOLE SODIUM SCH MG: 40 TABLET, DELAYED RELEASE ORAL at 08:13

## 2017-05-15 RX ADMIN — LEVOTHYROXINE SODIUM SCH MCG: 137 TABLET ORAL at 08:13

## 2017-05-15 RX ADMIN — PHENYTOIN SODIUM SCH MG: 100 CAPSULE ORAL at 10:42

## 2017-05-15 RX ADMIN — ASPIRIN SCH MG: 81 TABLET, COATED ORAL at 08:16

## 2017-05-15 RX ADMIN — ALLOPURINOL SCH MG: 100 TABLET ORAL at 10:42

## 2017-05-15 RX ADMIN — FUROSEMIDE SCH MG: 20 TABLET ORAL at 08:16

## 2017-05-15 RX ADMIN — PHENYTOIN SODIUM SCH MG: 100 CAPSULE ORAL at 08:13

## 2017-05-15 RX ADMIN — PARICALCITOL SCH MCG: 1 CAPSULE, LIQUID FILLED ORAL at 08:15

## 2017-05-15 RX ADMIN — INSULIN ASPART SCH UNITS: 100 INJECTION, SOLUTION INTRAVENOUS; SUBCUTANEOUS at 08:00

## 2017-05-15 RX ADMIN — MULTIPLE VITAMINS W/ MINERALS TAB SCH TAB: TAB at 08:14

## 2017-05-15 NOTE — DS
DATE OF DISCHARGE:  05/15/2017



HOSPITAL SUMMARY:  A 66-year-old black female with end-stage renal disease, on

dialysis.  She has had a dialysis catheter placed about a week prior to

admission.  She came in with increasing fatigue, weakness, and confusion.  There

were no overt physical findings.  This was felt to be likely due to need for

undergoing dialysis.  Hemoglobin was low at 9.2 and remained stable with normal

white count.  Chemistry profile was consistent with end-stage renal disease. 

Blood sugars have been stable on lower doses of insulin.  Urine was clear, and

blood gas was unremarkable.  Urine culture and blood cultures had no growth.  CT

scan of the head was normal, and chest x-ray was clear.  She underwent placement

of a temporary dialysis catheter in Interventional Radiology on the right

internal jugular line per Dr. Bender and underwent 3 to 4 episodes of

hemodialysis prior to dismissal.  She and family both felt due to her weakness

and difficulty getting around that she would go to skilled nursing where she is

transferred today to [facility] for continued care.



FINAL DIAGNOSES:

1.  End-stage renal disease, now on dialysis.

2.  Altered mental status secondary to uremia.

3.  Anemia secondary to end-stage renal disease.



OPERATIONS AND PROCEDURES:  Tunneled right internal jugular dialysis catheter

placement and hemodialysis.



COMPLICATIONS:  None.



CONSULTATIONS:  Dr. Bender ____ group.



DISPOSITION:  Home medications remain the same except for reduction of

amlodipine to 5 mg and lisinopril to 10 mg, and further reductions based on

blood pressures.  Better renal function now allows better blood pressure

control.  Renal diet.  Activity as tolerated.  Prognosis is guarded.

 



______________________________

GODFREY RAHMAN MD



DR:  JAMES/becky  JOB#:  [id number] / [id number]

DD:  05/15/2017 08:28  DT:  05/15/2017 10:07

## 2017-05-15 NOTE — DISCH
DISCHARGE


FINAL DIAGNOSIS


Problems


Medical Problems:


(1) Altered mental status


Status: Acute  








CONDITION ON DISCHARGE:  Stable


SNF STAY <30 DAYS:  Yes


POST DISCHARGE ORDERS


ACTIVITY ORDERS:  Activity as tolerated


WEIGHT BEARING STATUS:  As tolerated


DIET AFTER DISCHARGE:  Renal


WOUND/INCISION CARE:  Ice to area for comfort, Keep wound/cast CDI





FOLLOW-UP


PHYSICIAN FOLLOW-UP:  as scheduled











GODFREY RAHMAN MD May 15, 2017 08:25

## 2017-05-15 NOTE — PDOC
Dialysis Progress Note


Dialysis Note


Dialysis Note


Seen on Hemodialysis, tolerating treatment        Well





Vitals on Hemodialysis: 131/58      59   afeb





 





 


General Appearance:          


Awake:          


Alert Oriented  x      3


Neck:    No JVD or JVP


Chest:    CTA Harry


Heart:    S1 S2


Abdomen -    Soft NTND


Extremities -    No Edema





ESRD ARF:   Dialysis as below





F 180 NR 3.0 Hrs





3 K 2.5 Ca 140 Na  35 HC03





Qb 350 + Qd 500+





Heparin  0 Units





Uf 1 Kgs or to dry weight as tolerated





May give 25-50 gms of 25% Albumin if needed to maintain Hemodynamic stability





Treatment plan reviewed and discussed with Dialysis RN





Vitals


Vital Signs





Vital Signs








  Date Time  Temp Pulse Resp B/P (MAP) Pulse Ox O2 Delivery O2 Flow Rate FiO2


 


5/15/17 11:00 97.9 62 18 113/56 (75) 97 Room Air  





 97.9       


 


5/15/17 07:00       2.0 











Labs


Last Labs





Laboratory Tests








Test


  5/13/17


16:38 5/13/17


20:14 5/14/17


07:28 5/14/17


11:42


 


Glucose (Fingerstick)


  211 mg/dL


(70-99) 123 mg/dL


(70-99) 145 mg/dL


(70-99) 191 mg/dL


(70-99)


 


Test


  5/14/17


16:59 5/14/17


20:53 5/15/17


04:05 5/15/17


07:31


 


Glucose (Fingerstick)


  163 mg/dL


(70-99) 213 mg/dL


(70-99) 


  74 mg/dL


(70-99)


 


Sodium Level


  


  


  139 mmol/L


(136-145) 


 


 


Potassium Level


  


  


  4.0 mmol/L


(3.5-5.1) 


 


 


Chloride Level


  


  


  102 mmol/L


() 


 


 


Carbon Dioxide Level


  


  


  25 mmol/L


(21-32) 


 


 


Anion Gap   12 (6-14)  


 


Blood Urea Nitrogen


  


  


  48 mg/dL


(7-20) 


 


 


Creatinine


  


  


  5.3 mg/dL


(0.6-1.0) 


 


 


Estimated GFR


(Cockcroft-Gault) 


  


  9.7 


  


 


 


Glucose Level


  


  


  119 mg/dL


(70-99) 


 


 


Calcium Level


  


  


  9.7 mg/dL


(8.5-10.1) 


 


 


Test


  5/15/17


10:59 


  


  


 


 


Glucose (Fingerstick)


  141 mg/dL


(70-99) 


  


  


 








Laboratory Tests








Test


  5/14/17


16:59 5/14/17


20:53 5/15/17


04:05 5/15/17


07:31


 


Glucose (Fingerstick)


  163 mg/dL


(70-99) 213 mg/dL


(70-99) 


  74 mg/dL


(70-99)


 


Sodium Level


  


  


  139 mmol/L


(136-145) 


 


 


Potassium Level


  


  


  4.0 mmol/L


(3.5-5.1) 


 


 


Chloride Level


  


  


  102 mmol/L


() 


 


 


Carbon Dioxide Level


  


  


  25 mmol/L


(21-32) 


 


 


Anion Gap   12 (6-14)  


 


Blood Urea Nitrogen


  


  


  48 mg/dL


(7-20) 


 


 


Creatinine


  


  


  5.3 mg/dL


(0.6-1.0) 


 


 


Estimated GFR


(Cockcroft-Gault) 


  


  9.7 


  


 


 


Glucose Level


  


  


  119 mg/dL


(70-99) 


 


 


Calcium Level


  


  


  9.7 mg/dL


(8.5-10.1) 


 


 


Test


  5/15/17


10:59 


  


  


 


 


Glucose (Fingerstick)


  141 mg/dL


(70-99) 


  


  


 











Assessment


Assessment


Problems


Medical Problems:


(1) Altered mental status


Status: Acute  








Problems:  





Plan


Plan of Care


Problems


Medical Problems:


(1) Altered mental status


Status: Acute  

















TASHI ROQUE MD May 15, 2017 14:08

## 2017-10-13 ENCOUNTER — HOSPITAL ENCOUNTER (OUTPATIENT)
Dept: HOSPITAL 61 - KCIC DEXA | Age: 67
Discharge: HOME | End: 2017-10-13
Attending: FAMILY MEDICINE
Payer: MEDICARE

## 2017-10-13 DIAGNOSIS — Z78.0: ICD-10-CM

## 2017-10-13 DIAGNOSIS — Z13.820: Primary | ICD-10-CM

## 2017-10-13 PROCEDURE — 77080 DXA BONE DENSITY AXIAL: CPT

## 2017-10-13 NOTE — [facility]
Bone mineral density study dated 10/13/2017.

 

Indication: Postmenopausal screening.

 

Findings:

 

Lower lumbar spine:

 

BMD (g/cm2): Total L1-L4.......... 1.174.

.

T-Score: Total L1-L4.................... 1.2.

Z-Score: Total L1-L4 ................... 2.5.

 

Left Hip:

 

BMD (g/cm2): Total .......... 0.840.

.

T-Score: Total .................... -0.8.

Z-Score: Total  ................... -0.1.

 

World Health Organization criteria for BMD interpretation classify 

patients as Normal (T-score at or above -1.0), Osteopenic (T-score between

-1.0 and -2.5), or Osteoporotic (T-score at or below -2.5).

 

Impression:

 

Bone mineral density values are within the range of normal.

 

Electronically signed by: Irving Hernández MD (10/13/2017 1:25 PM) 

[facility]

## 2018-08-27 ENCOUNTER — HOSPITAL ENCOUNTER (INPATIENT)
Dept: HOSPITAL 61 - SURG | Age: 68
Discharge: HOME | DRG: 335 | End: 2018-08-27
Attending: SURGERY | Admitting: SURGERY
Payer: MEDICARE

## 2018-08-27 VITALS — SYSTOLIC BLOOD PRESSURE: 174 MMHG | DIASTOLIC BLOOD PRESSURE: 68 MMHG

## 2018-08-27 VITALS
SYSTOLIC BLOOD PRESSURE: 172 MMHG | DIASTOLIC BLOOD PRESSURE: 61 MMHG | SYSTOLIC BLOOD PRESSURE: 172 MMHG | DIASTOLIC BLOOD PRESSURE: 61 MMHG

## 2018-08-27 VITALS — DIASTOLIC BLOOD PRESSURE: 63 MMHG | SYSTOLIC BLOOD PRESSURE: 187 MMHG

## 2018-08-27 VITALS — HEIGHT: 68 IN | BODY MASS INDEX: 43.65 KG/M2 | WEIGHT: 288 LBS

## 2018-08-27 VITALS — SYSTOLIC BLOOD PRESSURE: 203 MMHG | DIASTOLIC BLOOD PRESSURE: 74 MMHG

## 2018-08-27 VITALS — DIASTOLIC BLOOD PRESSURE: 75 MMHG | SYSTOLIC BLOOD PRESSURE: 188 MMHG

## 2018-08-27 VITALS — DIASTOLIC BLOOD PRESSURE: 71 MMHG | SYSTOLIC BLOOD PRESSURE: 175 MMHG

## 2018-08-27 VITALS — SYSTOLIC BLOOD PRESSURE: 154 MMHG | DIASTOLIC BLOOD PRESSURE: 74 MMHG

## 2018-08-27 VITALS — DIASTOLIC BLOOD PRESSURE: 71 MMHG | SYSTOLIC BLOOD PRESSURE: 179 MMHG

## 2018-08-27 VITALS — DIASTOLIC BLOOD PRESSURE: 77 MMHG | SYSTOLIC BLOOD PRESSURE: 156 MMHG

## 2018-08-27 DIAGNOSIS — K66.0: ICD-10-CM

## 2018-08-27 DIAGNOSIS — K42.9: Primary | ICD-10-CM

## 2018-08-27 DIAGNOSIS — N18.6: ICD-10-CM

## 2018-08-27 DIAGNOSIS — Z99.2: ICD-10-CM

## 2018-08-27 DIAGNOSIS — E66.9: ICD-10-CM

## 2018-08-27 DIAGNOSIS — E11.22: ICD-10-CM

## 2018-08-27 PROCEDURE — 0DNU4ZZ RELEASE OMENTUM, PERCUTANEOUS ENDOSCOPIC APPROACH: ICD-10-PCS | Performed by: SURGERY

## 2018-08-27 PROCEDURE — 0WHG43Z INSERTION OF INFUSION DEVICE INTO PERITONEAL CAVITY, PERCUTANEOUS ENDOSCOPIC APPROACH: ICD-10-PCS | Performed by: SURGERY

## 2018-08-27 PROCEDURE — C1892 INTRO/SHEATH,FIXED,PEEL-AWAY: HCPCS

## 2018-08-27 PROCEDURE — 3E1M39Z IRRIGATION OF PERITONEAL CAVITY USING DIALYSATE, PERCUTANEOUS APPROACH: ICD-10-PCS | Performed by: SURGERY

## 2018-08-27 PROCEDURE — 0WQF4ZZ REPAIR ABDOMINAL WALL, PERCUTANEOUS ENDOSCOPIC APPROACH: ICD-10-PCS | Performed by: SURGERY

## 2018-08-27 PROCEDURE — 82962 GLUCOSE BLOOD TEST: CPT

## 2018-08-27 PROCEDURE — S0028 INJECTION, FAMOTIDINE, 20 MG: HCPCS

## 2018-08-27 PROCEDURE — A7015 AEROSOL MASK USED W NEBULIZE: HCPCS

## 2018-08-27 NOTE — DISCH
DISCHARGE INSTRUCTIONS


Condition on Discharge


Condition on Discharge:  Stable





Activity After Discharge


Activity Instructions for Disc:  Activity as tolerated


Other activity instructions:  ok to shower


Lifting Instructions after Dis:  No heavy lifting, Do not lift >10 pounds


Exercise Instruction after Dis:  Progress as tolerated


Driving Instructions after Dis:  Do not drive today


Weight Bearing Status after Di:  No restrictions





Diet after Discharge


Diet after Discharge:  Renal Dialysis


Liquid Texture:  Thin Liquid





Wound Incision Care


Wound/Incision Care:  Keep wound/cast CDI, Reinforce dressing PRN





Contacting the DRMary after DC


Call your doctor for:  Concerns you may have





Follow-Up


Follow up with:  Dr Helms 1 week











JOSE SHAFFER APRN Aug 27, 2018 14:53

## 2018-08-27 NOTE — OP
DATE OF SURGERY:  



PREOPERATIVE DIAGNOSES:  End-stage renal disease and umbilical hernia. 

Currently, on hemodialysis.



POSTOPERATIVE DIAGNOSES:  End-stage renal disease and umbilical hernia,

currently on hemodialysis.



PROCEDURE:  Laparoscopic placement of peritoneal dialysis catheter, lysis of

adhesions, primary repair of supraumbilical and umbilical hernias.



SURGEON:  Bishop Dupont MD.



FIRST ASSISTANT:  Sherrie Marion.



ANESTHESIA:  General endotracheal.



ESTIMATED BLOOD LOSS:  25.



INTRAVENOUS FLUIDS:  550.



INDICATIONS:  The patient is an obese 67-year-old lady with end-stage renal

disease, on hemodialysis, requesting peritoneal dialysis catheter placed.



DESCRIPTION OF PROCEDURE:  The patient was brought to the operating suite, given

a general endotracheal anesthetic, Mills catheter placement and drainage.  The

abdomen was prepped and draped in usual sterile fashion.  An infraumbilical

incision was infiltrated with local anesthetic, incised and a 5 mm Visiport used

to safely gain access into the abdominal cavity.  Pneumoperitoneum established. 

Camera inserted.  Inspection carried out.



This showed extensive omental adhesions in the upper abdomen from previous open

cholecystectomy.  In light of this, a suprapubic and left upper quadrant ports

were placed under direct vision, and this allowed careful dissection of the

omental adhesions away from the abdominal wall, clearing a space in the

abdominal cavity for adequate fluid.  Care was taken during the dissection to

avoid the bowel.  Under direct vision, an incision was made for the catheter

insertion site and a Cook needle advanced into the abdominal cavity.  Needle was

removed.  The dilators were passed, and the catheter was then placed into the

pelvis and the Dacron cuff seated just above the peritoneum.  Excess catheter

was then tunneled subcutaneously to the access site.



We then addressed the hernia by infiltrating the skin with local, incising the

umbilicus and mobilizing the hernia sac and incarcerated contents.  This was

delivered.  The fascial rent was then closed in running fashion with looped #1

PDS tied in the middle.



Subcutaneous approximated with 3-0 Vicryl.



The catheter was then flushed with 500 mL of normal saline, which are readily

accepted.  Drained a similar amount.  The catheter was then "packed" with

heparinized saline.  Skin incisions closed with subcuticular 4-0 Monocryl. 

Steri-Strips and sterile dressings applied.  The patient awakened from her

anesthetic and taken to the recovery room in satisfactory condition after

removing her Mills catheter.

 



______________________________

BISHOP DUPONT MD



DR:  HAO/becky  JOB#:  [id number] / [id number]

DD:  08/27/2018 09:58  DT:  08/27/2018 10:30



nitin MARCUS DR

## 2018-08-29 NOTE — PATHOLOGY
[facility] Accession Number: [id number]

.                                                                01

Material submitted:                                        .

SUPRAUMBILICAL HERNIA SAC AND INCARCERATED CONTENTS

.                                                                01

Clinical history:                                          .

None provided

.                                                                02

**********************************************************************

Diagnosis:

Segment of skin and subcutaneous tissue and attached focal

mesothelial-lined fibromembranous and adipose tissue, supraumbilical

hernia repair:

- Hernia sac and contents with focal scarring of abdominal wall.

LB/08/28/2018

**********************************************************************

.                                                                02

Comment:

There is no evidence of malignancy.

(JPM/db; 8/28/18)

.                                                                02

Electronically signed:                                     .

Yash Jaeger MD, Pathologist

NPI- [id number]

.                                                                01

Gross description:                                         .

The specimen is received in formalin, labeled "Haroon, Carmen and supra

umbilical hernia sac and incarcerated contents", is an unoriented ellipse

of tan skin, measuring 7.5 x 2.0 cm, with underlying attached yellow

lobulated adipose tissue partially covered by a gray-white membrane 11.0 x

5.0 x 4.5 cm.  The skin surface has an umblicated focus.  Sectioning of the

underlying soft tissue shows yellow lobulated cut surface with gray-white

fibrous tissue.

.

Representative sections are submitted as follows:

A1.      Skin and underlying soft tissue

A2-A3.   Gray-white fibrous and yellow soft tissue

(SWS; 8/27/2018)

SHS/SHS

.                                                                02

Pathologist provided ICD-10:

K43.9

.                                                                02

CPT                                                        .

[id number]

***Performed at:  01

   [facility]

   [address], [place]  [id number]

   MD Antoine Garrett MD Phone:  [phone]

***Performed at:  02

   [facility]

   [address], [place]  [id number]

   MD Yash Jaeger MD Phone:  [phone]

## 2018-09-20 ENCOUNTER — HOSPITAL ENCOUNTER (OUTPATIENT)
Dept: HOSPITAL 61 - INTRAD | Age: 68
Discharge: HOME | End: 2018-09-20
Attending: INTERNAL MEDICINE
Payer: MEDICARE
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
## 2018-09-20 DIAGNOSIS — Z88.8: ICD-10-CM
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
## 2018-09-20 DIAGNOSIS — Z88.0: ICD-10-CM

## 2018-09-20 DIAGNOSIS — Z88.2: ICD-10-CM

## 2018-09-20 DIAGNOSIS — Z88.6: ICD-10-CM

## 2018-09-20 DIAGNOSIS — T85.898A: Primary | ICD-10-CM

## 2018-09-20 PROCEDURE — C1769 GUIDE WIRE: HCPCS

## 2018-09-20 PROCEDURE — 49400 AIR INJECTION INTO ABDOMEN: CPT

## 2018-09-20 PROCEDURE — 74190 PERITONEOGRAM RS&I: CPT

## 2018-09-20 NOTE — RAD
Procedure: Fluoroscopic guided peritoneal dialysis catheter injection and

manipulation



Clinical Indication: 67-year-old with 3-week-old arterial dialysis catheter

which fails to aspirate



Sedation: None



Antibiotics: Antibiotic was administered intravenously within 1 hour of the

procedure start time. 





Exposure: Kerma-Area Product:  61646.3 [id number] 



Contrast: 10 cc of Isovue-300 contrast media



Sterility: All elements of maximal sterile barrier technique including the use

of a cap, mask, sterile gown, sterile gloves, large sterile sheet, appropriate

hand hygiene, and 2% chlorhexidine for cutaneous antisepsis (or acceptable

alternative antiseptic per current guidelines) were followed for this

procedure. 



Consent: 

The procedure was explained in its entirety to the patient or the patients

designated representative by a member of the treatment team, including a

discussion of the risks, benefits and commonly accepted alternatives to the

procedure, as well as the expected consequences of no therapy whatsoever.  

Discussion of the risks included, but was not limited to, those that are most

frequent and those that are rare but possibly severe or life-threatening, as

well as the possibility of unforeseen complications.



Technique and Findings: Following informed consent, the patient was prepped

and draped in usual sterile fashion.  Fluoroscopy over the abdomen revealed an

intact peritoneal dialysis catheter which was seen to have an anterior lay. 

The catheter was easily injected with saline, but failed aspirated.  Contrast

was injected demonstrating patency of the entirety of the catheter, with

extravasation into the peritoneum.  A stiff Glidewire and 5 Tristanian angled

catheter were then advanced coaxially through the catheter and used to

manipulate the distal pigtail into a different location within the right

hemipelvis.  This resulted in restoration of easy aspiration.  The catheter

wire were then removed.



Complications: No immediate



Impression:

1. Successful manipulation of the distal tip of the peritoneal dialysis

catheter resulting in restoration of flow.  The peritoneal dialysis catheter

is suitable for use.

## 2018-09-20 NOTE — PDOC
BRIEF OPERATIVE NOTE


Pre-Op Diagnosis


Malfunctioning PD Catheter


Post-Op Diagnosis


same


Procedure Performed


PD Catheter manipulation


Surgeon


Kirti


Anesthesia Type:  Conscious Sedation


Findings


Anterior lay of the PD catheter tip with poor flow which is significantly 

improved after reposition of the tip


Complications


No immediate











DAMIAN RICO MD Sep 20, 2018 15:34